# Patient Record
Sex: MALE | Race: WHITE
[De-identification: names, ages, dates, MRNs, and addresses within clinical notes are randomized per-mention and may not be internally consistent; named-entity substitution may affect disease eponyms.]

---

## 2018-08-23 NOTE — RADIOLOGY REPORT (SQ)
EXAM DESCRIPTION:  CHEST PA/LATERAL



COMPLETED DATE/TIME:  8/23/2018 10:50 am



REASON FOR STUDY:  PRE-OP



COMPARISON:  None.



EXAM PARAMETERS:  NUMBER OF VIEWS: two views

TECHNIQUE: Digital Frontal and Lateral radiographic views of the chest acquired.

RADIATION DOSE: NA

LIMITATIONS: none



FINDINGS:  LUNGS AND PLEURA: Mild bronchiectasis in the perihilar regions bilaterally.  No focal cons
olidation.  Chronic elevation right diaphragm.

MEDIASTINUM AND HILAR STRUCTURES: No masses or contour abnormalities.

HEART AND VASCULAR STRUCTURES: Heart normal size.  No evidence for failure.

BONES: No acute findings.

HARDWARE: None in the chest.

OTHER: No other significant finding.



IMPRESSION:  No acute findings in the chest.



TECHNICAL DOCUMENTATION:  JOB ID:  [id number]

 2011 [employer]- All Rights Reserved



Reading location - IP/workstation name: [email or web address]

## 2018-09-07 NOTE — PHYSICIAN ADVISORY NOTE
Physician Advisor ProgressNote


.: 


Pursuant to the  [facility], I have reviewed the medical record 

for this patient.  





Physician Advisor Statement: 





Surgical necessity - VERY NICELY DOCUMENTED IN H&P INCLUDING X-RAY DETAILS.





Status:  Blue MCR Adv pt.  Need to know if there is pre-authorization for Inpt 

or if determination is needed like typical MCR cases.  Will update this note 

when this info is available.





CK

## 2018-09-24 NOTE — OPERATIVE REPORT
Operative Report


DATE OF SURGERY: 09/24/18


PREOPERATIVE DIAGNOSIS: Left knee arthritis


OPERATION: Left knee arthroplasty


SURGEON: KARLY GARCIA


ANESTHESIA: Spinal


TISSUE REMOVED OR ALTERED: Bone to pathology


ESTIMATED BLOOD LOSS: 100


PROCEDURE: 





Implants used:


Femur: [place] triathlon size 5 CR femur


Tibia: 4 tibia


Tibial liner: 9 mm CS insert


Patella: 35 mm oval patella





Procedure with the patient supine on the operating table the left the limb is 

prepped and draped in a sterile fashion.  The limb was elevated for 

exsanguination and the tourniquet inflated to 280 torr.  A standard midline 

median parapatellar approach the knee is taken.  Access is gained to the 

femoral canal through the intercondylar notch.  Intramedullary alignment 

instrumentation used to resect 10 mm of distal femur in 5 of valgus.  Sizing 

guide indicated a size 5 femur.  Appropriate cutting jig is then used to 

fashion anterior posterior and chamfer cuts.  A trial reduction femurs 

performed and this is judged to be adequate.





Attention was next turned to the tibia.  Using an extra medullary alignment 

system 9 millimeters was resected off the lateral tibial plateau.  This is 

sized to a size 4 tibia.  A trial reduction was now performed with a 5 femur 

and a for tibia using a 9 millimeters spacer.  It is full extension and central 

patellofemoral tracking.  The articular surface the patella was next resected 

using an oscillating saw.





All trial implants were removed.  Polymethylmethacrylate is mixed and used to 

cement the above implants in place.  On adequate curing the cement excess 

cement was removed the tourniquet was deflated hemostasis obtained the wound is 

then closed in layers using interrupted Vicryl followed by staples.  A sterile 

compressive dressing was applied and the patient returned to recovery room in 

satisfactory condition.

## 2018-09-24 NOTE — RADIOLOGY REPORT (SQ)
EXAM DESCRIPTION:  KNEE LEFT 2 VIEWS



COMPLETED DATE/TIME:  9/24/2018 9:16 am



REASON FOR STUDY:  Post OP -Long Cassette in PACU M17.12  UNILATERAL PRIMARY OSTEOARTHRITIS, LEFT KNE
E



COMPARISON:  None.



NUMBER OF VIEWS:  Two views



TECHNIQUE:  Digital radiographic images of the left knee post-procedure.



LIMITATIONS:  None.



FINDINGS:  BONES: No  worrisome or unexpected findings post-procedure.

DEVICE: Left total knee replacement with patellar resurfacing.

SOFT TISSUES:  No worrisome findings.  Expected postoperative soft tissue changes.



IMPRESSION:  SATISFACTORY POSTOPERATIVE LEFT KNEE.



TECHNICAL DOCUMENTATION:  JOB ID:  [id number]

 2011 [employer]- All Rights Reserved



Reading location - IP/workstation name: [email or web address]

## 2018-09-25 NOTE — PDOC PROGRESS REPORT
Subjective


Progress Note for:: 09/25/18


Reason For Visit: 


M17.12 UNILATERAL PRIMARY OSTEOARTHRITIS, LEFT KNE


68-year-old white male postop day 1 status post left knee arthroplasty.  

Patient with a temperature max of 38.1 last night and and ambulation of 5 feet 

yesterday with physical therapy





Physical Exam


Vital Signs: 


 











Temp Pulse Resp BP Pulse Ox


 


 36.9 C   96   16   110/57 L  96 


 


 09/25/18 03:36  09/25/18 00:08  09/25/18 00:08  09/25/18 00:08  09/25/18 00:08








 Intake & Output











 09/23/18 09/24/18 09/25/18





 06:59 06:59 06:59


 


Intake Total  0 4650


 


Output Total   3675


 


Balance  0 975


 


Weight   111.4 kg











General appearance: PRESENT: no acute distress, mild distress


Head exam: PRESENT: normocephalic


Respiratory exam: PRESENT: unlabored


Cardiovascular exam: PRESENT: RRR


Vascular exam: PRESENT: normal capillary refill


GI/Abdominal exam: PRESENT: soft


Rectal exam: PRESENT: deferred


Extremities exam: PRESENT: other - Left lower extremity dressing clean dry and 

intact.  Distal neurovascular examination is intact.


Neurological exam: PRESENT: alert, awake, oriented to person, oriented to place

, oriented to time, oriented to situation.  ABSENT: motor sensory deficit


Psychiatric exam: PRESENT: appropriate affect, normal mood.  ABSENT: homicidal 

ideation, suicidal ideation


Skin exam: PRESENT: dry, intact, warm.  ABSENT: cyanosis, rash





Results


Laboratory Results: 


 





 09/25/18 06:10 





 











  09/24/18 09/25/18





  06:01 06:10


 


WBC   11.9 H


 


RBC   4.09 L


 


Hgb   13.0 L


 


Hct   37.7 L


 


MCV   92


 


MCH   31.8


 


MCHC   34.5


 


RDW   13.3


 


Plt Count   145 L


 


Potassium  4.2 











Impressions: 


 





Knee X-Ray  09/24/18 08:35


IMPRESSION:  SATISFACTORY POSTOPERATIVE LEFT KNEE.


 











Status: Imported from PACS





Assessment & Plan





- Diagnosis


(1) Status post left knee replacement


Is this a current diagnosis for this admission?: Yes   


Plan: 


Patient to be mobilized with physical therapy and weightbearing as tolerated 

basis.  This point the patient does not have the functional capacity for 

consideration of discharge home.  We will continue on with physical therapy 

today and observe for further febrile episodes.  Anticipate discharge home 

tomorrow with home health services and DME.








- Time


Time Spent with patient: 15-24 minutes


Anticipated discharge: Home with Homehealth


Within: within 24 hours

## 2018-09-26 NOTE — PDOC DISCHARGE SUMMARY
General





- Admit/Disc Date/PCP


Admission Date/Primary Care Provider: 


  09/24/18 05:38





  REBECCA CARCAMO





Discharge Date: 09/26/18





- Discharge Diagnosis


(1) Status post left knee replacement


Is this a current diagnosis for this admission?: Yes   





- Additional Information


Resuscitation Status: Full Code


Home Medications: 








Acetaminophen with Codeine [Tylenol #3 Tablet] 1 tab PO BID 09/24/18 


Atorvastatin Calcium [Lipitor 10 mg Tablet] 10 mg PO QHS 09/24/18 


Baclofen [Baclofen 10 mg Tablet] 10 mg PO BID 09/24/18 


Cholecalciferol (Vitamin D3) [Vitamin D3] 2,000 unit PO DAILY 09/24/18 


Cyanocobalamin (Vitamin B-12) [Vitamin B-12 1000 mcg Tablet] 1,000 mcg PO DAILY 

09/24/18 


Lisinopril/Hydrochlorothiazide [Lisinopril-Hctz 20-12.5 mg Tab] 1 tab PO DAILY 

09/24/18 


Piroxicam [Feldene] 20 mg PO DAILY 09/24/18 











History of Present Illness


History of Present Illness: 


JOSE RAMON NICOLAS is a 68 year old male


Patient is a 68-year-old white male with progressive bilateral knee pain and 

functional disability secondary osteoarthritis.  Patient is admitted for 

elective left knee arthroplasty.





Hospital Course


Hospital Course: 





Patient is admitted through the operating where he undergoes unconjugated left 

knee arthroplasty.  Is returned to floor in satisfactory condition makes 

excellent progress with physical therapy and ablating 200 feet.  He 

subsequently for discharge home.





Physical Exam


Vital Signs: 


 











Temp Pulse Resp BP Pulse Ox


 


 36.7 C   87   18   112/57 L  93 


 


 09/25/18 23:11  09/25/18 23:11  09/25/18 23:11  09/25/18 23:11  09/25/18 23:11








 Intake & Output











 09/25/18 09/26/18 09/27/18





 06:59 06:59 06:59


 


Intake Total 4822 1813 


 


Output Total 9145 540 


 


Balance 1147 1273 


 


Weight 111.4 kg 110.2 kg 











General appearance: PRESENT: no acute distress


Head exam: PRESENT: normocephalic


Respiratory exam: PRESENT: unlabored


Cardiovascular exam: PRESENT: RRR


Pulses: PRESENT: +1 pedal pulses bilateral


Vascular exam: PRESENT: normal capillary refill


GI/Abdominal exam: PRESENT: soft


Rectal exam: PRESENT: deferred


Extremities exam: PRESENT: other - Left knee compressive dressings removed on 

postop day 2.  The underlying postoperative dressing remains clean dry and 

intact.  There is minimal pedal edema.  Distal neurovascular examination is 

intact.


Neurological exam: PRESENT: alert, awake, oriented to person, oriented to place

, oriented to time, oriented to situation.  ABSENT: motor sensory deficit


Psychiatric exam: PRESENT: appropriate affect, normal mood.  ABSENT: homicidal 

ideation, suicidal ideation


Skin exam: PRESENT: dry, intact, warm.  ABSENT: cyanosis, rash





Results


Laboratory Results: 


 





 09/26/18 04:50 





 09/25/18 06:10 





 











  09/26/18





  04:50


 


WBC  14.4 H


 


RBC  3.78 L


 


Hgb  12.0 L


 


Hct  35.6 L


 


MCV  94


 


MCH  31.8


 


MCHC  33.8


 


RDW  13.6


 


Plt Count  141 L











Impressions: 


 





Knee X-Ray  09/24/18 08:35


IMPRESSION:  SATISFACTORY POSTOPERATIVE LEFT KNEE.


 











Status: Imported from PACS





Qualifiers





- *


PATIENT BEING DISCHARGED WITH ANY OF THE FOLLOWING DIAGNOSIS: No


VTE patient discharged on overlapping Therapy?: Yes





Plan


Discharge Plan: 





Patient be discharged home with home health nursing, home health physical 

therapy, and DME.  Follow-up Dr. Jessica Sims [place] for surgery in 2 weeks 

for staple removal.


Time Spent: Less than 30 Minutes

## 2018-10-31 ENCOUNTER — HOSPITAL ENCOUNTER (OUTPATIENT)
Dept: HOSPITAL 62 - OROUT | Age: 68
Discharge: HOME | End: 2018-10-31
Attending: ORTHOPAEDIC SURGERY
Payer: MEDICARE

## 2018-10-31 VITALS — DIASTOLIC BLOOD PRESSURE: 72 MMHG | SYSTOLIC BLOOD PRESSURE: 100 MMHG

## 2018-10-31 DIAGNOSIS — M19.90: ICD-10-CM

## 2018-10-31 DIAGNOSIS — M24.662: Primary | ICD-10-CM

## 2018-10-31 DIAGNOSIS — Z96.651: ICD-10-CM

## 2018-10-31 DIAGNOSIS — Z87.891: ICD-10-CM

## 2018-10-31 DIAGNOSIS — E78.00: ICD-10-CM

## 2018-10-31 DIAGNOSIS — Z79.899: ICD-10-CM

## 2018-10-31 DIAGNOSIS — I10: ICD-10-CM

## 2018-10-31 LAB
ANION GAP SERPL CALC-SCNC: 12 MMOL/L (ref 5–19)
BUN SERPL-MCNC: 32 MG/DL (ref 7–20)
CALCIUM: 9.9 MG/DL (ref 8.4–10.2)
CHLORIDE SERPL-SCNC: 102 MMOL/L (ref 98–107)
CO2 SERPL-SCNC: 25 MMOL/L (ref 22–30)
ERYTHROCYTE [DISTWIDTH] IN BLOOD BY AUTOMATED COUNT: 13.9 % (ref 11.5–14)
GLUCOSE SERPL-MCNC: 114 MG/DL (ref 75–110)
HCT VFR BLD CALC: 38.6 % (ref 37.9–51)
HGB BLD-MCNC: 13.3 G/DL (ref 13.5–17)
MCH RBC QN AUTO: 31.9 PG (ref 27–33.4)
MCHC RBC AUTO-ENTMCNC: 34.4 G/DL (ref 32–36)
MCV RBC AUTO: 93 FL (ref 80–97)
PLATELET # BLD: 147 10^3/UL (ref 150–450)
POTASSIUM SERPL-SCNC: 4.4 MMOL/L (ref 3.6–5)
RBC # BLD AUTO: 4.17 10^6/UL (ref 4.35–5.55)
SODIUM SERPL-SCNC: 139.3 MMOL/L (ref 137–145)
WBC # BLD AUTO: 7.7 10^3/UL (ref 4–10.5)

## 2018-10-31 PROCEDURE — 36415 COLL VENOUS BLD VENIPUNCTURE: CPT

## 2018-10-31 PROCEDURE — 85027 COMPLETE CBC AUTOMATED: CPT

## 2018-10-31 PROCEDURE — 80048 BASIC METABOLIC PNL TOTAL CA: CPT

## 2018-10-31 PROCEDURE — 29999 UNLISTED PX ARTHROSCOPY: CPT

## 2018-10-31 RX ADMIN — FENTANYL CITRATE ONE MCG: 50 INJECTION INTRAMUSCULAR; INTRAVENOUS at 09:15

## 2018-10-31 RX ADMIN — FENTANYL CITRATE ONE MCG: 50 INJECTION INTRAMUSCULAR; INTRAVENOUS at 09:20

## 2018-10-31 NOTE — OPERATIVE REPORT
Operative Report


DATE OF SURGERY: 10/31/18


PREOPERATIVE DIAGNOSIS: Arthrofibrosis following left knee arthroplasty


OPERATION: Closed manipulation left knee


SURGEON: KARLY GARCIA


ANESTHESIA: Moderate Sedation


ESTIMATED BLOOD LOSS: 0


PROCEDURE: 





With the patient supine on the operative table under conscious sedation knee 

range of motion is examined.  There is adequate patella mobility.  Passive 

range of motion is approximately 8-80 degrees.  The knee is manipulated into 

complete extension as well this as well as further flexion to 135 degrees.  All 

of the extremes of range of motion as well as the preoperative range of motion 

are documented by intraoperative photography.  The patient's return to the 

recovery room in satisfactory condition.

## 2018-10-31 NOTE — DISCHARGE SUMMARY
Discharge Summary (SDC)





- Discharge


Final Diagnosis: 





Arthrofibrosis following left knee arthroplasty


Date of Surgery: 10/31/18


Discharge Date: 10/31/18


Condition: Good


Treatment or Instructions: 


Activity as tolerated


Prescriptions: 


Acetaminophen with Codeine [Tylenol #3 Tablet] 1 tab PO Q6 #60 tablet


Referrals: 


REBECCA CARCAMO MD [Primary Care Provider] - 


Discharge Diet: As Tolerated, Regular


Respiratory Treatments at Home: Deep Breathing/Coughing


Discharge Activity: Activity As Tolerated, No tub bath, Other - Continue 

physical therapy for aggressive range of motion exercises


Home Care Assistance: None Needed


Activities Provided by Home Health Agency: Physical Therapy


Report the Following to Your Physician Immediately: Shortness of Breath, Fever 

over 101 Degrees, Drainage-Foul Smelling

## 2018-11-13 ENCOUNTER — HOSPITAL ENCOUNTER (OUTPATIENT)
Dept: HOSPITAL 62 - OD | Age: 68
End: 2018-11-13
Attending: ORTHOPAEDIC SURGERY
Payer: MEDICARE

## 2018-11-13 DIAGNOSIS — Z01.818: Primary | ICD-10-CM

## 2018-11-13 LAB
ADD MANUAL DIFF: NO
ANION GAP SERPL CALC-SCNC: 12 MMOL/L (ref 5–19)
APPEARANCE UR: (no result)
APTT PPP: YELLOW S
BASOPHILS # BLD AUTO: 0.1 10^3/UL (ref 0–0.2)
BASOPHILS NFR BLD AUTO: 1.4 % (ref 0–2)
BILIRUB UR QL STRIP: NEGATIVE
BUN SERPL-MCNC: 26 MG/DL (ref 7–20)
CALCIUM: 10.1 MG/DL (ref 8.4–10.2)
CHLORIDE SERPL-SCNC: 102 MMOL/L (ref 98–107)
CO2 SERPL-SCNC: 28 MMOL/L (ref 22–30)
EOSINOPHIL # BLD AUTO: 0.5 10^3/UL (ref 0–0.6)
EOSINOPHIL NFR BLD AUTO: 6.1 % (ref 0–6)
ERYTHROCYTE [DISTWIDTH] IN BLOOD BY AUTOMATED COUNT: 14 % (ref 11.5–14)
GLUCOSE SERPL-MCNC: 92 MG/DL (ref 75–110)
GLUCOSE UR STRIP-MCNC: NEGATIVE MG/DL
HCT VFR BLD CALC: 38.5 % (ref 37.9–51)
HGB BLD-MCNC: 13.2 G/DL (ref 13.5–17)
KETONES UR STRIP-MCNC: NEGATIVE MG/DL
LYMPHOCYTES # BLD AUTO: 1.3 10^3/UL (ref 0.5–4.7)
LYMPHOCYTES NFR BLD AUTO: 17 % (ref 13–45)
MCH RBC QN AUTO: 31.6 PG (ref 27–33.4)
MCHC RBC AUTO-ENTMCNC: 34.2 G/DL (ref 32–36)
MCV RBC AUTO: 92 FL (ref 80–97)
MONOCYTES # BLD AUTO: 1 10^3/UL (ref 0.1–1.4)
MONOCYTES NFR BLD AUTO: 13.5 % (ref 3–13)
NEUTROPHILS # BLD AUTO: 4.7 10^3/UL (ref 1.7–8.2)
NEUTS SEG NFR BLD AUTO: 62 % (ref 42–78)
NITRITE UR QL STRIP: NEGATIVE
PH UR STRIP: 5 [PH] (ref 5–9)
PLATELET # BLD: 270 10^3/UL (ref 150–450)
POTASSIUM SERPL-SCNC: 5.1 MMOL/L (ref 3.6–5)
PROT UR STRIP-MCNC: NEGATIVE MG/DL
RBC # BLD AUTO: 4.18 10^6/UL (ref 4.35–5.55)
SODIUM SERPL-SCNC: 142.3 MMOL/L (ref 137–145)
SP GR UR STRIP: 1.02
TOTAL CELLS COUNTED % (AUTO): 100 %
UROBILINOGEN UR-MCNC: NEGATIVE MG/DL (ref ?–2)
WBC # BLD AUTO: 7.6 10^3/UL (ref 4–10.5)

## 2018-11-13 PROCEDURE — 93005 ELECTROCARDIOGRAM TRACING: CPT

## 2018-11-13 PROCEDURE — 36415 COLL VENOUS BLD VENIPUNCTURE: CPT

## 2018-11-13 PROCEDURE — 80048 BASIC METABOLIC PNL TOTAL CA: CPT

## 2018-11-13 PROCEDURE — 85025 COMPLETE CBC W/AUTO DIFF WBC: CPT

## 2018-11-13 PROCEDURE — 81001 URINALYSIS AUTO W/SCOPE: CPT

## 2018-11-13 PROCEDURE — 93010 ELECTROCARDIOGRAM REPORT: CPT

## 2018-11-13 PROCEDURE — 71046 X-RAY EXAM CHEST 2 VIEWS: CPT

## 2018-11-13 NOTE — RADIOLOGY REPORT (SQ)
EXAM DESCRIPTION:  CHEST PA/LATERAL



COMPLETED DATE/TIME:  11/13/2018 12:25 pm



REASON FOR STUDY:  PRE-OP



COMPARISON:  CT abdomen pelvis 2/26/2013

Chest films 8/23/2018



EXAM PARAMETERS:  NUMBER OF VIEWS: two views

TECHNIQUE: Digital Frontal and Lateral radiographic views of the chest acquired.

RADIATION DOSE: NA

LIMITATIONS: none



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
ragmatic paralysis.

There are increased interstitial markings throughout the right lung, unchanged, likely micro atelecta
sis or scarring.

No acute infiltrates.  No pleural effusion.  No pneumothorax.

MEDIASTINUM AND HILAR STRUCTURES: No masses or contour abnormalities.

HEART AND VASCULAR STRUCTURES: Heart normal size.  No evidence for failure.

BONES: No acute findings.

HARDWARE: None in the chest.

OTHER: No other significant finding.



IMPRESSION:  Chronic elevation right hemidiaphragm with chronic increased interstitial markings in th
e right lung

No acute findings



TECHNICAL DOCUMENTATION:  JOB ID:  [id number]

 2011 [employer]- All Rights Reserved



Reading location - IP/workstation name: [email or web address]

## 2018-11-14 NOTE — EKG REPORT
SEVERITY:- ABNORMAL ECG -

SINUS RHYTHM

RIGHT BUNDLE BRANCH BLOCK

:

Confirmed by: Keiry Blake 14-Nov-2018 07:25:28

## 2018-12-10 ENCOUNTER — HOSPITAL ENCOUNTER (INPATIENT)
Dept: HOSPITAL 62 - INOR | Age: 68
LOS: 2 days | Discharge: HOME HEALTH | DRG: 470 | End: 2018-12-12
Attending: ORTHOPAEDIC SURGERY | Admitting: ORTHOPAEDIC SURGERY
Payer: MEDICARE

## 2018-12-10 DIAGNOSIS — Z96.652: ICD-10-CM

## 2018-12-10 DIAGNOSIS — T84.82XA: ICD-10-CM

## 2018-12-10 DIAGNOSIS — E78.00: ICD-10-CM

## 2018-12-10 DIAGNOSIS — M17.11: Primary | ICD-10-CM

## 2018-12-10 DIAGNOSIS — Z23: ICD-10-CM

## 2018-12-10 DIAGNOSIS — I10: ICD-10-CM

## 2018-12-10 PROCEDURE — 0SRC0J9 REPLACEMENT OF RIGHT KNEE JOINT WITH SYNTHETIC SUBSTITUTE, CEMENTED, OPEN APPROACH: ICD-10-PCS | Performed by: ORTHOPAEDIC SURGERY

## 2018-12-10 PROCEDURE — 88311 DECALCIFY TISSUE: CPT

## 2018-12-10 PROCEDURE — 85027 COMPLETE CBC AUTOMATED: CPT

## 2018-12-10 PROCEDURE — 01402 ANES OPN/ARTH TOT KNE ARTHRP: CPT

## 2018-12-10 PROCEDURE — G0008 ADMIN INFLUENZA VIRUS VAC: HCPCS

## 2018-12-10 PROCEDURE — 90471 IMMUNIZATION ADMIN: CPT

## 2018-12-10 PROCEDURE — 88304 TISSUE EXAM BY PATHOLOGIST: CPT

## 2018-12-10 PROCEDURE — 94799 UNLISTED PULMONARY SVC/PX: CPT

## 2018-12-10 PROCEDURE — 90686 IIV4 VACC NO PRSV 0.5 ML IM: CPT

## 2018-12-10 PROCEDURE — C1776 JOINT DEVICE (IMPLANTABLE): HCPCS

## 2018-12-10 PROCEDURE — 84132 ASSAY OF SERUM POTASSIUM: CPT

## 2018-12-10 PROCEDURE — 36415 COLL VENOUS BLD VENIPUNCTURE: CPT

## 2018-12-10 PROCEDURE — 80048 BASIC METABOLIC PNL TOTAL CA: CPT

## 2018-12-10 PROCEDURE — C1713 ANCHOR/SCREW BN/BN,TIS/BN: HCPCS

## 2018-12-10 RX ADMIN — IBUPROFEN SCH MLS/HR: 800 INJECTION INTRAVENOUS at 15:59

## 2018-12-10 RX ADMIN — PREGABALIN SCH MG: 75 CAPSULE ORAL at 11:34

## 2018-12-10 RX ADMIN — SENNOSIDES, DOCUSATE SODIUM SCH EACH: 50; 8.6 TABLET, FILM COATED ORAL at 11:34

## 2018-12-10 RX ADMIN — PREGABALIN SCH MG: 75 CAPSULE ORAL at 17:22

## 2018-12-10 RX ADMIN — Medication SCH CAP: at 11:34

## 2018-12-10 RX ADMIN — MORPHINE SULFATE PRN MG: 10 INJECTION INTRAMUSCULAR; INTRAVENOUS; SUBCUTANEOUS at 15:43

## 2018-12-10 RX ADMIN — OXYCODONE HYDROCHLORIDE SCH MG: 10 TABLET, FILM COATED, EXTENDED RELEASE ORAL at 11:12

## 2018-12-10 RX ADMIN — LISINOPRIL SCH: 10 TABLET ORAL at 11:36

## 2018-12-10 RX ADMIN — SENNOSIDES, DOCUSATE SODIUM SCH EACH: 50; 8.6 TABLET, FILM COATED ORAL at 17:22

## 2018-12-10 RX ADMIN — MORPHINE SULFATE PRN MG: 10 INJECTION INTRAMUSCULAR; INTRAVENOUS; SUBCUTANEOUS at 13:51

## 2018-12-10 RX ADMIN — Medication SCH: at 13:34

## 2018-12-10 RX ADMIN — BACLOFEN SCH MG: 10 TABLET ORAL at 11:34

## 2018-12-10 RX ADMIN — BACLOFEN SCH MG: 10 TABLET ORAL at 17:22

## 2018-12-10 RX ADMIN — ATORVASTATIN CALCIUM SCH MG: 10 TABLET, FILM COATED ORAL at 22:00

## 2018-12-10 RX ADMIN — HYDROCHLOROTHIAZIDE SCH: 12.5 CAPSULE ORAL at 11:35

## 2018-12-10 RX ADMIN — Medication SCH ML: at 22:00

## 2018-12-10 RX ADMIN — OXYCODONE HYDROCHLORIDE SCH MG: 10 TABLET, FILM COATED, EXTENDED RELEASE ORAL at 21:59

## 2018-12-10 NOTE — OPERATIVE REPORT
Operative Report


DATE OF SURGERY: 12/10/18


PREOPERATIVE DIAGNOSIS: Knee arthritis


OPERATION: Right knee arthroplasty


SURGEON: KARLY GARCIA


ANESTHESIA: Spinal


TISSUE REMOVED OR ALTERED: Bone to pathology


ESTIMATED BLOOD LOSS: 100


PROCEDURE: 





Implants used:


Femur: [place] triathlon size 7 CR femur


Tibia: 6 tibia


Tibial liner: 9 mm CS insert


Patella: 38 mm oval patella





Procedure with the patient supine on the operating table the right the limb is 

prepped and draped in a sterile fashion.  The limb was elevated for 

exsanguination and the tourniquet inflated to 280 torr.  A standard midline 

median parapatellar approach the knee is taken.  Access is gained to the 

femoral canal through the intercondylar notch.  Intramedullary alignment 

instrumentation used to resect 10 mm of distal femur in 5 of valgus.  Sizing 

guide indicated a size 7 femur.  Appropriate cutting jig is then used to 

fashion anterior posterior and chamfer cuts.  A trial reduction femurs 

performed and this is judged to be adequate.





Attention was next turned to the tibia.  Using an extra medullary alignment 

system 9 millimeters was resected off the lateral tibial plateau.  This is 

sized to a size 6 tibia.  A trial reduction was now performed with a 7 femur 

and a 6 tibia using a 9 millimeters spacer.  It is full extension and central 

patellofemoral tracking.  The articular surface the patella was next resected 

using an oscillating saw.





All trial implants were removed.  Polymethylmethacrylate is mixed and used to 

cement the above implants in place.  On adequate curing the cement excess 

cement was removed the tourniquet was deflated hemostasis obtained the wound is 

then closed in layers using interrupted Vicryl followed by staples.  A sterile 

compressive dressing was applied and the patient returned to recovery room in 

satisfactory condition.

## 2018-12-10 NOTE — RADIOLOGY REPORT (SQ)
EXAM DESCRIPTION:  KNEE RIGHT 2 VIEWS



COMPLETED DATE/TIME:  12/10/2018 9:08 am



REASON FOR STUDY:  Post OP -Long Cassette in PACU M17.11  UNILATERAL PRIMARY OSTEOARTHRITIS, RIGHT KN
EE



COMPARISON:  9/24/2018



NUMBER OF VIEWS:  Two views.



TECHNIQUE:  AP and lateral radiographic images acquired of the right knee.



LIMITATIONS:  None.



FINDINGS:  MINERALIZATION: Normal.

BONES: No acute fracture or dislocation.  No worrisome bone lesions.

JOINT: Expected postoperative findings status post right knee total arthroplasty with postoperative i
ntra-articular air and overlying skin staples.

SOFT TISSUES: No soft tissue swelling.  No radio-opaque foreign body.

OTHER: No other significant finding.



IMPRESSION:  Expected postoperative findings status post right knee total arthroplasty with postopera
tive intra-articular air and overlying skin staples.  No evidence perihardware fracture.



TECHNICAL DOCUMENTATION:  JOB ID:  [id number]

 2011 [employer]- All Rights Reserved



Reading location - IP/workstation name: [email or web address]

## 2018-12-11 LAB
ANION GAP SERPL CALC-SCNC: 13 MMOL/L (ref 5–19)
BUN SERPL-MCNC: 15 MG/DL (ref 7–20)
CALCIUM: 9.5 MG/DL (ref 8.4–10.2)
CHLORIDE SERPL-SCNC: 100 MMOL/L (ref 98–107)
CO2 SERPL-SCNC: 24 MMOL/L (ref 22–30)
ERYTHROCYTE [DISTWIDTH] IN BLOOD BY AUTOMATED COUNT: 14.4 % (ref 11.5–14)
GLUCOSE SERPL-MCNC: 147 MG/DL (ref 75–110)
HCT VFR BLD CALC: 35.2 % (ref 37.9–51)
HGB BLD-MCNC: 11.9 G/DL (ref 13.5–17)
MCH RBC QN AUTO: 30.8 PG (ref 27–33.4)
MCHC RBC AUTO-ENTMCNC: 33.8 G/DL (ref 32–36)
MCV RBC AUTO: 91 FL (ref 80–97)
PLATELET # BLD: 156 10^3/UL (ref 150–450)
POTASSIUM SERPL-SCNC: 4.5 MMOL/L (ref 3.6–5)
RBC # BLD AUTO: 3.86 10^6/UL (ref 4.35–5.55)
SODIUM SERPL-SCNC: 136.5 MMOL/L (ref 137–145)
WBC # BLD AUTO: 12.7 10^3/UL (ref 4–10.5)

## 2018-12-11 RX ADMIN — OXYCODONE HYDROCHLORIDE PRN MG: 5 TABLET ORAL at 12:26

## 2018-12-11 RX ADMIN — LISINOPRIL SCH MG: 10 TABLET ORAL at 10:19

## 2018-12-11 RX ADMIN — Medication SCH ML: at 05:42

## 2018-12-11 RX ADMIN — Medication SCH CAP: at 10:19

## 2018-12-11 RX ADMIN — ASPIRIN SCH MG: 81 TABLET, COATED ORAL at 10:20

## 2018-12-11 RX ADMIN — OXYCODONE HYDROCHLORIDE PRN MG: 5 TABLET ORAL at 07:47

## 2018-12-11 RX ADMIN — IBUPROFEN SCH MLS/HR: 800 INJECTION INTRAVENOUS at 17:24

## 2018-12-11 RX ADMIN — Medication SCH ML: at 21:28

## 2018-12-11 RX ADMIN — LANSOPRAZOLE SCH MG: 30 TABLET, ORALLY DISINTEGRATING, DELAYED RELEASE ORAL at 05:42

## 2018-12-11 RX ADMIN — OXYCODONE HYDROCHLORIDE SCH MG: 10 TABLET, FILM COATED, EXTENDED RELEASE ORAL at 21:27

## 2018-12-11 RX ADMIN — IBUPROFEN SCH MLS/HR: 800 INJECTION INTRAVENOUS at 07:53

## 2018-12-11 RX ADMIN — PREGABALIN SCH MG: 75 CAPSULE ORAL at 10:19

## 2018-12-11 RX ADMIN — BACLOFEN SCH MG: 10 TABLET ORAL at 10:20

## 2018-12-11 RX ADMIN — Medication SCH: at 14:12

## 2018-12-11 RX ADMIN — HYDROCHLOROTHIAZIDE SCH MG: 12.5 CAPSULE ORAL at 10:20

## 2018-12-11 RX ADMIN — IBUPROFEN SCH MLS/HR: 800 INJECTION INTRAVENOUS at 00:41

## 2018-12-11 RX ADMIN — ATORVASTATIN CALCIUM SCH MG: 10 TABLET, FILM COATED ORAL at 21:27

## 2018-12-11 RX ADMIN — PREGABALIN SCH MG: 75 CAPSULE ORAL at 17:23

## 2018-12-11 RX ADMIN — SENNOSIDES, DOCUSATE SODIUM SCH EACH: 50; 8.6 TABLET, FILM COATED ORAL at 17:23

## 2018-12-11 RX ADMIN — SENNOSIDES, DOCUSATE SODIUM SCH EACH: 50; 8.6 TABLET, FILM COATED ORAL at 10:19

## 2018-12-11 RX ADMIN — OXYCODONE HYDROCHLORIDE SCH MG: 10 TABLET, FILM COATED, EXTENDED RELEASE ORAL at 10:21

## 2018-12-11 RX ADMIN — OXYCODONE HYDROCHLORIDE PRN MG: 5 TABLET ORAL at 17:23

## 2018-12-11 RX ADMIN — BACLOFEN SCH MG: 10 TABLET ORAL at 17:24

## 2018-12-11 RX ADMIN — MORPHINE SULFATE PRN MG: 10 INJECTION INTRAMUSCULAR; INTRAVENOUS; SUBCUTANEOUS at 02:42

## 2018-12-11 NOTE — PDOC PROGRESS REPORT
Subjective


Progress Note for:: 12/11/18


Reason For Visit: 


M17.11 UNILATERAL PRIMARY OSTEOARTHRITIS, RIGHT KN


68-year-old white male postop day 1 status post right knee arthroplasty.  

Patient ambulated 100 feet with physical therapy yesterday.  Is complaining of 

pain today although appears relatively sedated by my estimation.





Physical Exam


Vital Signs: 


 











Temp Pulse Resp BP Pulse Ox


 


 36.9 C   91   16   134/64 H  98 


 


 12/11/18 00:01  12/11/18 00:01  12/11/18 00:01  12/11/18 00:01  12/11/18 00:01








 Intake & Output











 12/10/18 12/11/18 12/12/18





 06:59 06:59 06:59


 


Intake Total 0 5550 


 


Output Total  3150 


 


Balance 0 2400 


 


Weight  103.2 kg 











Physical Exam: 





Middle-aged white male somewhat difficult to hold a conversation with him that 

he tends to drift his attention.


General appearance: PRESENT: no acute distress, mild distress


Head exam: PRESENT: normocephalic


Respiratory exam: PRESENT: unlabored


Cardiovascular exam: PRESENT: RRR


Pulses: PRESENT: +1 pedal pulses bilateral


Vascular exam: PRESENT: normal capillary refill


GI/Abdominal exam: PRESENT: soft


Rectal exam: PRESENT: deferred


Extremities exam: PRESENT: other - Right lower extremity dressing clean dry and 

intact.  Distal neurovascular examination is intact.


Neurological exam: PRESENT: alert, awake, oriented to person, oriented to place

, oriented to time, oriented to situation.  ABSENT: motor sensory deficit


Psychiatric exam: PRESENT: appropriate affect, normal mood.  ABSENT: homicidal 

ideation, suicidal ideation


Skin exam: PRESENT: dry, intact, warm.  ABSENT: cyanosis, rash





Results


Laboratory Results: 


 





 12/11/18 05:28 





 











  12/11/18





  05:28


 


WBC  12.7 H


 


RBC  3.86 L


 


Hgb  11.9 L


 


Hct  35.2 L


 


MCV  91


 


MCH  30.8


 


MCHC  33.8


 


RDW  14.4 H


 


Plt Count  156











Impressions: 


 





Knee X-Ray  12/10/18 08:35


IMPRESSION:  Expected postoperative findings status post right knee total 

arthroplasty with postoperative intra-articular air and overlying skin staples.

  No evidence perihardware fracture.


 











Status: Imported from PACS





Assessment & Plan





- Diagnosis


(1) Arthritis of right knee


Is this a current diagnosis for this admission?: Yes   


Plan: 


Pain medication is adjusted.  Patient will continue with physical therapy.  

Anticipate discharge home tomorrow with home health services.








- Time


Time Spent with patient: 15-24 minutes


Anticipated discharge: Home with Homehealth


Within: within 24 hours

## 2018-12-12 VITALS — SYSTOLIC BLOOD PRESSURE: 137 MMHG | DIASTOLIC BLOOD PRESSURE: 82 MMHG

## 2018-12-12 LAB
ERYTHROCYTE [DISTWIDTH] IN BLOOD BY AUTOMATED COUNT: 14.1 % (ref 11.5–14)
HCT VFR BLD CALC: 31.3 % (ref 37.9–51)
HGB BLD-MCNC: 10.7 G/DL (ref 13.5–17)
MCH RBC QN AUTO: 31.5 PG (ref 27–33.4)
MCHC RBC AUTO-ENTMCNC: 34.2 G/DL (ref 32–36)
MCV RBC AUTO: 92 FL (ref 80–97)
PLATELET # BLD: 148 10^3/UL (ref 150–450)
RBC # BLD AUTO: 3.39 10^6/UL (ref 4.35–5.55)
WBC # BLD AUTO: 16.2 10^3/UL (ref 4–10.5)

## 2018-12-12 PROCEDURE — 3E0234Z INTRODUCTION OF SERUM, TOXOID AND VACCINE INTO MUSCLE, PERCUTANEOUS APPROACH: ICD-10-PCS | Performed by: ORTHOPAEDIC SURGERY

## 2018-12-12 RX ADMIN — PREGABALIN SCH: 75 CAPSULE ORAL at 09:35

## 2018-12-12 RX ADMIN — IBUPROFEN SCH MLS/HR: 800 INJECTION INTRAVENOUS at 00:50

## 2018-12-12 RX ADMIN — OXYCODONE HYDROCHLORIDE PRN MG: 5 TABLET ORAL at 11:17

## 2018-12-12 RX ADMIN — HYDROCHLOROTHIAZIDE SCH MG: 12.5 CAPSULE ORAL at 09:37

## 2018-12-12 RX ADMIN — LANSOPRAZOLE SCH MG: 30 TABLET, ORALLY DISINTEGRATING, DELAYED RELEASE ORAL at 05:26

## 2018-12-12 RX ADMIN — BACLOFEN SCH MG: 10 TABLET ORAL at 09:39

## 2018-12-12 RX ADMIN — SENNOSIDES, DOCUSATE SODIUM SCH EACH: 50; 8.6 TABLET, FILM COATED ORAL at 09:37

## 2018-12-12 RX ADMIN — LISINOPRIL SCH: 10 TABLET ORAL at 09:35

## 2018-12-12 RX ADMIN — ASPIRIN SCH MG: 81 TABLET, COATED ORAL at 09:37

## 2018-12-12 RX ADMIN — Medication SCH CAP: at 09:37

## 2018-12-12 RX ADMIN — Medication SCH ML: at 05:26

## 2018-12-12 NOTE — PDOC DISCHARGE SUMMARY
General





- Admit/Disc Date/PCP


Admission Date/Primary Care Provider: 


  12/10/18 05:31





  REBECCA CARCAMO





Discharge Date: 12/12/18





- Discharge Diagnosis


(1) Arthritis of right knee


Is this a current diagnosis for this admission?: Yes   





- Additional Information


Resuscitation Status: Full Code


Home Medications: 








Acetaminophen with Codeine [Tylenol #3 Tablet] 1 tab PO Q6HP PRN 12/10/18 


Atorvastatin Calcium [Lipitor 10 mg Tablet] 10 mg PO QHS 12/10/18 


Baclofen [Baclofen 10 mg Tablet] 10 mg PO BID 12/10/18 


Cholecalciferol (Vitamin D3) [Vitamin D3 2000 unit Tablet] 2,000 unit PO DAILY 

12/10/18 


Cyanocobalamin (Vitamin B-12) [Vitamin B-12 1000 mcg Tablet] 1,000 mcg PO DAILY 

12/10/18 


Lisinopril/Hydrochlorothiazide [Zestoretic 20-12.5 mg Tablet] 1 tab PO DAILY 12/

10/18 


Piroxicam [Feldene] 20 mg PO DAILY 12/10/18 











History of Present Illness


History of Present Illness: 


JOSE RAMON NICOLAS is a 68 year old male


The patient is a 68-year-old white male with progressive right knee pain and 

functional disability second osteoarthritis.  Patient is admitted for elective 

right knee arthroplasty.





Hospital Course


Hospital Course: 





Patient is admitted through the operating where he undergoes unconjugated right 

knee arthroplasty.  Is returned to floor in satisfactory condition.  He seen by 

physical therapy and begins weightbearing as tolerated toward program.  

Dressing is clean dry and intact.  Distal neurovascular examination is intact.





Physical Exam


Vital Signs: 


 











Temp Pulse Resp BP Pulse Ox


 


 36.9 C   95   17   127/72 H  96 


 


 12/11/18 23:54  12/11/18 23:54  12/11/18 23:54  12/11/18 23:54  12/11/18 23:54








 Intake & Output











 12/11/18 12/12/18 12/13/18





 06:59 06:59 06:59


 


Intake Total 0270 1827 


 


Output Total 3150 945 


 


Balance 2400 882 


 


Weight 103.2 kg 103.5 kg 











General appearance: PRESENT: no acute distress


Head exam: PRESENT: normocephalic


Respiratory exam: PRESENT: unlabored


Cardiovascular exam: PRESENT: RRR


Pulses: PRESENT: +1 pedal pulses bilateral


Vascular exam: PRESENT: normal capillary refill


GI/Abdominal exam: PRESENT: soft


Rectal exam: PRESENT: deferred


Extremities exam: PRESENT: other - Right knee compressive dressing removed on 

postop day 2.  Underlying op site is clean dry and intact.  Distal 

neurovascular examination is intact.


Neurological exam: PRESENT: alert, awake, oriented to person, oriented to place

, oriented to time, oriented to situation.  ABSENT: motor sensory deficit


Psychiatric exam: PRESENT: appropriate affect, normal mood.  ABSENT: homicidal 

ideation, suicidal ideation


Skin exam: PRESENT: dry, intact, warm.  ABSENT: cyanosis, rash





Results


Laboratory Results: 


 





 12/12/18 04:40 





 12/11/18 05:28 





 











  12/11/18 12/12/18





  05:28 04:40


 


WBC   16.2 H


 


RBC   3.39 L


 


Hgb   10.7 L


 


Hct   31.3 L


 


MCV   92


 


MCH   31.5


 


MCHC   34.2


 


RDW   14.1 H


 


Plt Count   148 L


 


Sodium  136.5 L 


 


Potassium  4.5 


 


Chloride  100 


 


Carbon Dioxide  24 


 


Anion Gap  13 


 


BUN  15 


 


Creatinine  0.69 


 


Est GFR ( Amer)  > 60 


 


Est GFR (Non-Af Amer)  > 60 


 


Glucose  147 H 


 


Calcium  9.5 











Impressions: 


 





Knee X-Ray  12/10/18 08:35


IMPRESSION:  Expected postoperative findings status post right knee total 

arthroplasty with postoperative intra-articular air and overlying skin staples.

  No evidence perihardware fracture.


 











Status: Imported from PACS





Qualifiers





- *


PATIENT BEING DISCHARGED WITH ANY OF THE FOLLOWING DIAGNOSIS: No


VTE patient discharged on overlapping Therapy?: Yes





Plan


Discharge Plan: 





Patient be discharged home with home health nursing home versus and DME.  Follow

-up with Dr. Jessica Sims [place] for surgery in 2 weeks for staple removal


Time Spent: Less than 30 Minutes

## 2018-12-16 ENCOUNTER — HOSPITAL ENCOUNTER (EMERGENCY)
Dept: HOSPITAL 62 - ER | Age: 68
Discharge: HOME | End: 2018-12-16
Payer: MEDICARE

## 2018-12-16 VITALS — DIASTOLIC BLOOD PRESSURE: 66 MMHG | SYSTOLIC BLOOD PRESSURE: 113 MMHG

## 2018-12-16 DIAGNOSIS — Z98.890: ICD-10-CM

## 2018-12-16 DIAGNOSIS — Z96.651: ICD-10-CM

## 2018-12-16 DIAGNOSIS — Z48.01: Primary | ICD-10-CM

## 2018-12-16 LAB
ADD MANUAL DIFF: NO
ANION GAP SERPL CALC-SCNC: 10 MMOL/L (ref 5–19)
BASOPHILS # BLD AUTO: 0.1 10^3/UL (ref 0–0.2)
BASOPHILS NFR BLD AUTO: 0.8 % (ref 0–2)
BUN SERPL-MCNC: 28 MG/DL (ref 7–20)
CALCIUM: 9.7 MG/DL (ref 8.4–10.2)
CHLORIDE SERPL-SCNC: 102 MMOL/L (ref 98–107)
CO2 SERPL-SCNC: 26 MMOL/L (ref 22–30)
EOSINOPHIL # BLD AUTO: 0.6 10^3/UL (ref 0–0.6)
EOSINOPHIL NFR BLD AUTO: 4.1 % (ref 0–6)
ERYTHROCYTE [DISTWIDTH] IN BLOOD BY AUTOMATED COUNT: 14.2 % (ref 11.5–14)
GLUCOSE SERPL-MCNC: 167 MG/DL (ref 75–110)
HCT VFR BLD CALC: 33.7 % (ref 37.9–51)
HGB BLD-MCNC: 11.5 G/DL (ref 13.5–17)
LYMPHOCYTES # BLD AUTO: 1.3 10^3/UL (ref 0.5–4.7)
LYMPHOCYTES NFR BLD AUTO: 9.1 % (ref 13–45)
MCH RBC QN AUTO: 30.7 PG (ref 27–33.4)
MCHC RBC AUTO-ENTMCNC: 34.2 G/DL (ref 32–36)
MCV RBC AUTO: 90 FL (ref 80–97)
MONOCYTES # BLD AUTO: 1.7 10^3/UL (ref 0.1–1.4)
MONOCYTES NFR BLD AUTO: 12.6 % (ref 3–13)
NEUTROPHILS # BLD AUTO: 10.2 10^3/UL (ref 1.7–8.2)
NEUTS SEG NFR BLD AUTO: 73.4 % (ref 42–78)
PLATELET # BLD: 328 10^3/UL (ref 150–450)
POTASSIUM SERPL-SCNC: 4.7 MMOL/L (ref 3.6–5)
RBC # BLD AUTO: 3.76 10^6/UL (ref 4.35–5.55)
SODIUM SERPL-SCNC: 137.6 MMOL/L (ref 137–145)
TOTAL CELLS COUNTED % (AUTO): 100 %
WBC # BLD AUTO: 13.9 10^3/UL (ref 4–10.5)

## 2018-12-16 PROCEDURE — 87040 BLOOD CULTURE FOR BACTERIA: CPT

## 2018-12-16 PROCEDURE — 80048 BASIC METABOLIC PNL TOTAL CA: CPT

## 2018-12-16 PROCEDURE — 99284 EMERGENCY DEPT VISIT MOD MDM: CPT

## 2018-12-16 PROCEDURE — 73564 X-RAY EXAM KNEE 4 OR MORE: CPT

## 2018-12-16 PROCEDURE — 36415 COLL VENOUS BLD VENIPUNCTURE: CPT

## 2018-12-16 PROCEDURE — 85025 COMPLETE CBC W/AUTO DIFF WBC: CPT

## 2018-12-16 PROCEDURE — 96374 THER/PROPH/DIAG INJ IV PUSH: CPT

## 2018-12-16 PROCEDURE — 96375 TX/PRO/DX INJ NEW DRUG ADDON: CPT

## 2018-12-16 NOTE — RADIOLOGY REPORT (SQ)
EXAM DESCRIPTION:  KNEE RIGHT 4 VIEWS



COMPLETED DATE/TIME:  12/16/2018 2:55 pm



REASON FOR STUDY:  Purulent drainage from surgical wound



COMPARISON:  Right knee x-ray 12/10/2018.



NUMBER OF VIEWS:  Four views.



TECHNIQUE:  AP, lateral, and both oblique radiographic images acquired of the right knee.



LIMITATIONS:  None.



FINDINGS:  MINERALIZATION: Normal.

BONES:  The patient is status post total right knee arthroplasty with patellar resurfacing.  No acute
 fracture or dislocation.  No worrisome bone lesions.

JOINT: Small effusion.

SOFT TISSUES: Interval development of diffuse soft tissue swelling.  Skin staples are seen at the ant
erior aspect of the knee.



IMPRESSION:  Status post total right knee arthroplasty with no radiographic evidence of acute fractur
e.  Interval development of diffuse soft tissue swelling.  Small joint effusion.



TECHNICAL DOCUMENTATION:  JOB ID:  [id number]

[id number]

 2011 [employer]- All Rights Reserved



Reading location - IP/workstation name: [email or web address]

## 2018-12-16 NOTE — ER DOCUMENT REPORT
ED Medical Screen (RME)





- General


Chief Complaint: Skin Problem


Stated Complaint: OPEN SORES ON LEGS, DRAINAGE


Time Seen by Provider: 12/16/18 13:52


Mode of Arrival: Wheelchair


Information source: Patient, Relative, [facility] Records


Notes: 





68-year-old male with hypertension, diabetes presents with concern for purulent 

drainage from the surgical site of his right knee.  Patient recently underwent 

total knee replacement with Dr. Garcia and was discharged on December 12, 2018.

  Wife reports purulent drainage with bandage changes.  Patient denies 

associated fever, nausea, vomiting.








I have greeted and performed a rapid initial assessment of this patient.  A 

comprehensive ED assessment and evaluation of the patient, analysis of test 

results and completion of medical decision making process we will be contacted 

by additional ED providers.








PHYSICAL EXAMINATION:





Vital signs reviewed-afebrile, tachycardic





GENERAL: Well-appearing, well-nourished and in no acute distress.





LUNGS: No respiratory distress





Musculoskeletal: Dressing in place over right knee.





NEUROLOGICAL:  Normal speech, 





PSYCH: Normal mood, normal affect.





SKIN: Left knee surgical scar clean dry intact.  Right knee with bandage in 

place no obvious erythema of the skin.


TRAVEL OUTSIDE OF THE [place] IN LAST 30 DAYS: No





- HPI


Onset: Last week


Onset/Duration: Gradual, Persistent


Quality of pain: Achy


Associated Symptoms: denies: Chills, Fever, Nausea


Exacerbated by: Movement


Relieved by: Denies


Similar symptoms previously: No


Recently seen / treated by doctor: Yes





- Related Data


Smoking: Non-smoker


Frequency of alcohol use: None


Drug Abuse: None


Allergies/Adverse Reactions: 


 





No Known Allergies Allergy (Verified 11/26/18 11:20)


 











Past Medical History





- Past Medical History


Cardiac Medical History: Reports: Hx Hypercholesterolemia, Hx Hypertension


   Denies: Hx Atrial Fibrillation, Hx Congestive Heart Failure, Hx Coronary 

Artery Disease, Hx Heart Attack, Hx Peripheral Vascular Disease, Hx Pulmonary 

Embolism, Hx Heart Murmur


Pulmonary Medical History: Reports: Hx Bronchitis - FEW YEARS AGO 2013


   Denies: Hx Asthma, Hx COPD, Hx Pneumonia, Hx Respiratory Failure, Hx Sleep 

Apnea, Hx Tuberculosis


Neurological Medical History: Denies: Hx Cerebrovascular Accident, Hx Seizures


Endocrine Medical History: Reports: Hx Diabetes Mellitus Type 2 - edge of being 

diabetic.  Denies: Hx Hyperthyroidism, Hx Hypothyroidism


Renal/ Medical History: Reports: Hx Benign Prostatic Hyperplasia, Hx Kidney 

Stones.  Denies: Hx End Stage Renal Disease, Hx Peritoneal Dialysis


Malignancy Medical History: Denies Hx Lung Cancer


GI Medical History: Reports: Hx Gastroesophageal Reflux Disease.  Denies: Hx 

Crohn's Disease, Hx Hiatal Hernia, Hx Irritable Bowel, Hx Liver Failure, Hx 

Pancreatitis, Hx Ulcer


Musculoskeltal Medical History: Reports Hx Arthritis - OA, Denies Hx 

Fibromyalgia, Denies Hx Muscular Dystrophy


Psychiatric Medical History: 


   Denies: Hx Bipolar Disorder, Hx Depression, Hx Post Traumatic Stress Disorder


Traumatic Medical History: Reports: Hx Fractures - right hand


Past Surgical History: Denies: Hx Appendectomy, Hx Bowel Surgery, Hx 

Cholecystectomy, Hx Colostomy, Hx Coronary Artery Bypass Graft, Hx Gastric 

Bypass Surgery, Hx Herniorrhaphy, Hx Pacemaker, Hx Tonsillectomy





- Immunizations


Immunizations up to date: Yes


Hx Diphtheria, Pertussis, Tetanus Vaccination: Yes


History of Influenza Vaccine for 10/2017 - 3/2018 Season: Yes


Influenza Administration Date for 10/2017 - 3/2018 Season: 10/01/17





Physical Exam





- Vital signs


Vitals: 





 











Temp Pulse Resp BP Pulse Ox


 


 97.7 F   104 H  16   121/74   95 


 


 12/16/18 13:31  12/16/18 13:31  12/16/18 13:31  12/16/18 13:31  12/16/18 13:31














Course





- Vital Signs


Vital signs: 





 











Temp Pulse Resp BP Pulse Ox


 


 97.7 F   104 H  16   121/74   95 


 


 12/16/18 13:31  12/16/18 13:31  12/16/18 13:31  12/16/18 13:31  12/16/18 13:31














Doctor's Discharge





- Discharge


Referrals: 


KARLY GARCIA MD [Primary Care Provider] - Follow up as needed

## 2018-12-16 NOTE — ER DOCUMENT REPORT
ED General





- General


Chief Complaint: Skin Problem


Stated Complaint: OPEN SORES ON LEGS, DRAINAGE


Time Seen by Provider: 12/16/18 13:52


Mode of Arrival: Wheelchair


Information source: Patient, Relative


Notes: 





This is a 68-year-old man with hypertension, peripheral vascular disease, 

status post total knee replacement last Monday (6 days ago).  Patient is 

brought into the emergency room because of concerns of green discharge from the 

wound.  Patient's wife states that they were 2 areas on the lateral aspect of 

the knee joint outside of where the wound dressing is that had blisters and 

then had some drainage.  They deny fever.  They deny any increased pain.


TRAVEL OUTSIDE OF THE [place] IN LAST 30 DAYS: No





- HPI


Onset: Just prior to arrival


Onset/Duration: Gradual


Quality of pain: Other - Patient has had pain with range of motion after 

surgery that is expected.  He has not had any increased pain over the last few 

days.


Severity: Moderate


Pain Level: 2


Associated symptoms: Chest pain, Fever, Shortness of breath


Exacerbated by: Movement


Relieved by: Denies


Similar symptoms previously: Yes


Recently seen / treated by doctor: Yes





- Related Data


Allergies/Adverse Reactions: 


 





No Known Allergies Allergy (Verified 11/26/18 11:20)


 











Past Medical History





- General


Information source: Patient, Relative, [facility] Records





- Social History


Smoking Status: Never Smoker


Cigarette use (# per day): No


Chew tobacco use (# tins/day): No


Frequency of alcohol use: None


Drug Abuse: None


Lives with: Spouse/Significant other


Family History: None


Patient has suicidal ideation: No


Patient has homicidal ideation: No





- Past Medical History


Cardiac Medical History: Reports: Hx Hypercholesterolemia, Hx Hypertension


   Denies: Hx Atrial Fibrillation, Hx Congestive Heart Failure, Hx Coronary 

Artery Disease, Hx Heart Attack, Hx Peripheral Vascular Disease, Hx Pulmonary 

Embolism, Hx Heart Murmur


Pulmonary Medical History: Reports: Hx Bronchitis - FEW YEARS AGO 2013


   Denies: Hx Asthma, Hx COPD, Hx Pneumonia, Hx Respiratory Failure, Hx Sleep 

Apnea, Hx Tuberculosis


Neurological Medical History: Denies: Hx Cerebrovascular Accident, Hx Seizures


Endocrine Medical History: Reports: Hx Diabetes Mellitus Type 2 - edge of being 

diabetic.  Denies: Hx Hyperthyroidism, Hx Hypothyroidism


Renal/ Medical History: Reports: Hx Benign Prostatic Hyperplasia, Hx Kidney 

Stones.  Denies: Hx End Stage Renal Disease, Hx Peritoneal Dialysis


Malignancy Medical History: Denies Hx Lung Cancer


GI Medical History: Reports: Hx Gastroesophageal Reflux Disease.  Denies: Hx 

Crohn's Disease, Hx Hiatal Hernia, Hx Irritable Bowel, Hx Liver Failure, Hx 

Pancreatitis, Hx Ulcer


Musculoskeletal Medical History: Reports Hx Arthritis - OA, Denies Hx 

Fibromyalgia, Denies Hx Muscular Dystrophy


Psychiatric Medical History: 


   Denies: Hx Bipolar Disorder, Hx Depression, Hx Post Traumatic Stress Disorder


Traumatic Medical History: Reports: Hx Fractures - right hand


Past Surgical History: Denies: Hx Appendectomy, Hx Bowel Surgery, Hx 

Cholecystectomy, Hx Colostomy, Hx Coronary Artery Bypass Graft, Hx Gastric 

Bypass Surgery, Hx Herniorrhaphy, Hx Pacemaker, Hx Tonsillectomy





- Immunizations


Immunizations up to date: Yes


Hx Diphtheria, Pertussis, Tetanus Vaccination: Yes


Hx Pneumococcal Vaccination: 10/01/17





Review of Systems





- Review of Systems


Constitutional: denies: Chills, Fever


EENT: No symptoms reported


Cardiovascular: No symptoms reported


Respiratory: No symptoms reported


Gastrointestinal: No symptoms reported


Genitourinary: No symptoms reported


Male Genitourinary: No symptoms reported


Musculoskeletal: See HPI


Skin: See HPI


Hematologic/Lymphatic: No symptoms reported


Neurological/Psychological: No symptoms reported





Physical Exam





- Vital signs


Vitals: 


 











Temp Pulse Resp BP Pulse Ox


 


 97.7 F   104 H  16   121/74   95 


 


 12/16/18 13:31  12/16/18 13:31  12/16/18 13:31  12/16/18 13:31  12/16/18 13:31











Notes: 





Physical exam:


 


GENERAL: Patient is alert and oriented x3, no acute distress





HEAD: Atraumatic, normocephalic.





EYES: Pupils equal round and reactive to light, extraocular movements intact, 

sclera anicteric, conjunctiva are normal.





ENT:   Moist mucous membranes.





NECK: Normal range of motion, supple without obvious mass 





LUNGS: Breath sounds clear to auscultation bilaterally and equal.  No wheezes 

rales or rhonchi.





HEART: Regular rate and rhythm without murmurs, rubs or gallops.





ABDOMEN: Soft, normoactive bowel sounds.  No tenderness to palpation.  No 

guarding, no rebound.  No masses appreciated.





EXTREMITIES: 





Left lower extremity: Old scar from total knee replacement. Normal range of 

motion, no pitting or edema.  There is Refill.


Right lower extremity: Patient still has dressings on from the surgery.  There 

is swelling to the lower extremity that is expected after the knee replacement.

  Distal cap refill is good.  There are 2 small areas of abrasions on the right 

lateral knee outside the dressings which is where the patient's wife said was 

draining.  There is no drainage now and it looks very superficial and 

noninfected.  There is one area of erythema on the medial side of the knee 

outside the dressings that looks more like abrasion and does not appear 

infected at this time.





NEUROLOGICAL: Cranial nerves II through XII grossly intact.  Normal speech, 

moving all extremities.





PSYCH: Normal mood, normal affect.





SKIN: As mentioned above





Course





- Re-evaluation


Re-evalutation: 





12/16/18 19:28





Operative dressings were removed.  The wound site was inspected: Its dry and 

intact.  There is no pus drainage expressible.  There is mild erythema around 

the edges of the wound (which would be expected).  There is no increased warmth 

or obvious infection.  The wound site was cleaned with ChloraPrep and allowed 

to dry.  The wound was redressed with Xeroform followed by an operative 

dressing.





Discussed case with Dr. Garcia (clinical case as well as labs) who knows 

patient well.  We will follow-up with patient on Tuesday.





- Vital Signs


Vital signs: 


 











Temp Pulse Resp BP Pulse Ox


 


 97.8 F   85   18   113/66   95 


 


 12/16/18 16:56  12/16/18 16:56  12/16/18 16:56  12/16/18 16:56  12/16/18 16:56














- Laboratory


Result Diagrams: 


 12/16/18 14:30





 12/16/18 14:30


Laboratory results interpreted by me: 


 











  12/16/18 12/16/18





  14:30 14:30


 


WBC  13.9 H 


 


RBC  3.76 L 


 


Hgb  11.5 L 


 


Hct  33.7 L 


 


RDW  14.2 H 


 


Lymphocytes %  9.1 L 


 


Absolute Neutrophils  10.2 H 


 


Absolute Monocytes  1.7 H 


 


BUN   28 H


 


Glucose   167 H














- Diagnostic Test


Radiology reviewed: Image reviewed, Reports reviewed - No acute injury





Discharge





- Discharge


Clinical Impression: 


 Wound check, Dressing change





Condition: Stable


Disposition: HOME, SELF-CARE


Additional Instructions: 


As we discussed, I did discuss the wound with Dr. Garcia as well as the lab 

results.


The surgical wound itself was without any pus drainage.


The dressings were changed


He does want to see you in the office on Tuesday.


In the meantime, you can put some bacitracin on the external areas that had 

previously shown blistered.


If you develop any fever (temperature greater than 100.5), pain, worsening 

redness, worsening swelling: Return to the ER at once.


Prescriptions: 


Bacitracin Zinc [Bacitracin Oint 15 gm] 1 applic TP DAILY #1 tube


Referrals: 


KARLY GARCIA MD [Primary Care Provider] - 12/18/18

## 2019-08-04 ENCOUNTER — HOSPITAL ENCOUNTER (EMERGENCY)
Dept: HOSPITAL 62 - ER | Age: 69
LOS: 1 days | Discharge: HOME | End: 2019-08-05
Payer: MEDICARE

## 2019-08-04 DIAGNOSIS — Z96.653: ICD-10-CM

## 2019-08-04 DIAGNOSIS — R07.81: Primary | ICD-10-CM

## 2019-08-04 DIAGNOSIS — I10: ICD-10-CM

## 2019-08-04 DIAGNOSIS — Z87.442: ICD-10-CM

## 2019-08-04 DIAGNOSIS — E78.00: ICD-10-CM

## 2019-08-04 DIAGNOSIS — M25.511: ICD-10-CM

## 2019-08-04 LAB
ADD MANUAL DIFF: NO
ALBUMIN SERPL-MCNC: 4.6 G/DL (ref 3.5–5)
ALP SERPL-CCNC: 89 U/L (ref 38–126)
ANION GAP SERPL CALC-SCNC: 10 MMOL/L (ref 5–19)
AST SERPL-CCNC: 35 U/L (ref 17–59)
BASOPHILS # BLD AUTO: 0.1 10^3/UL (ref 0–0.2)
BASOPHILS NFR BLD AUTO: 0.7 % (ref 0–2)
BILIRUB DIRECT SERPL-MCNC: 0.4 MG/DL (ref 0–0.4)
BILIRUB SERPL-MCNC: 0.4 MG/DL (ref 0.2–1.3)
BUN SERPL-MCNC: 43 MG/DL (ref 7–20)
CALCIUM: 10.5 MG/DL (ref 8.4–10.2)
CHLORIDE SERPL-SCNC: 104 MMOL/L (ref 98–107)
CK MB SERPL-MCNC: 1.1 NG/ML (ref ?–4.55)
CK SERPL-CCNC: 106 U/L (ref 55–170)
CO2 SERPL-SCNC: 27 MMOL/L (ref 22–30)
EOSINOPHIL # BLD AUTO: 0.3 10^3/UL (ref 0–0.6)
EOSINOPHIL NFR BLD AUTO: 2.4 % (ref 0–6)
ERYTHROCYTE [DISTWIDTH] IN BLOOD BY AUTOMATED COUNT: 14.4 % (ref 11.5–14)
GLUCOSE SERPL-MCNC: 155 MG/DL (ref 75–110)
HCT VFR BLD CALC: 36.8 % (ref 37.9–51)
HGB BLD-MCNC: 12.5 G/DL (ref 13.5–17)
LYMPHOCYTES # BLD AUTO: 1.5 10^3/UL (ref 0.5–4.7)
LYMPHOCYTES NFR BLD AUTO: 12 % (ref 13–45)
MCH RBC QN AUTO: 31.3 PG (ref 27–33.4)
MCHC RBC AUTO-ENTMCNC: 34.1 G/DL (ref 32–36)
MCV RBC AUTO: 92 FL (ref 80–97)
MONOCYTES # BLD AUTO: 1.2 10^3/UL (ref 0.1–1.4)
MONOCYTES NFR BLD AUTO: 9.3 % (ref 3–13)
NEUTROPHILS # BLD AUTO: 9.4 10^3/UL (ref 1.7–8.2)
NEUTS SEG NFR BLD AUTO: 75.6 % (ref 42–78)
PLATELET # BLD: 197 10^3/UL (ref 150–450)
POTASSIUM SERPL-SCNC: 4.4 MMOL/L (ref 3.6–5)
PROT SERPL-MCNC: 8.2 G/DL (ref 6.3–8.2)
RBC # BLD AUTO: 4.01 10^6/UL (ref 4.35–5.55)
TOTAL CELLS COUNTED % (AUTO): 100 %
TROPONIN I SERPL-MCNC: < 0.012 NG/ML
WBC # BLD AUTO: 12.5 10^3/UL (ref 4–10.5)

## 2019-08-04 PROCEDURE — 71046 X-RAY EXAM CHEST 2 VIEWS: CPT

## 2019-08-04 PROCEDURE — 80053 COMPREHEN METABOLIC PANEL: CPT

## 2019-08-04 PROCEDURE — 82550 ASSAY OF CK (CPK): CPT

## 2019-08-04 PROCEDURE — 36415 COLL VENOUS BLD VENIPUNCTURE: CPT

## 2019-08-04 PROCEDURE — 82553 CREATINE MB FRACTION: CPT

## 2019-08-04 PROCEDURE — 84484 ASSAY OF TROPONIN QUANT: CPT

## 2019-08-04 PROCEDURE — 85379 FIBRIN DEGRADATION QUANT: CPT

## 2019-08-04 PROCEDURE — 94640 AIRWAY INHALATION TREATMENT: CPT

## 2019-08-04 PROCEDURE — 93010 ELECTROCARDIOGRAM REPORT: CPT

## 2019-08-04 PROCEDURE — 93005 ELECTROCARDIOGRAM TRACING: CPT

## 2019-08-04 PROCEDURE — 99284 EMERGENCY DEPT VISIT MOD MDM: CPT

## 2019-08-04 PROCEDURE — 85025 COMPLETE CBC W/AUTO DIFF WBC: CPT

## 2019-08-04 NOTE — ER DOCUMENT REPORT
ED Medical Screen (RME)





- General


Chief Complaint: Chest Pain


Stated Complaint: CHEST PAIN


Time Seen by Provider: 08/04/19 18:52


Primary Care Provider: 


KARLY GARCIA MD [Primary Care Provider] - Follow up as needed


Notes: 





Patient is a 69-year-old male with a past medical history of arthritis who 

presents to the emergency department with chest pain.  His symptoms started at 

1600 today.  He states that it hurts for him to take a deep breath in.  His pain

is in his bilateral chest.  Patient states that he did some yard work yesterday 

and felt like he may have overdone it.  Patient has arthritis and he takes 

Tylenol with codeine and ibuprofen for his arthritis.  He also takes some 

vitamins.  Denies any other past medical history.





Exam: Inspiratory and expiratory wheezes and right upper lobe.





I have greeted and performed a rapid initial assessment of this patient.  A 

comprehensive ED assessment and evaluation of the patient, analysis of test 

results and completion of medical decision making process will be conducted by 

an additional ED providers.


TRAVEL OUTSIDE OF THE [place] IN LAST 30 DAYS: No





- Related Data


Allergies/Adverse Reactions: 


                                        





No Known Allergies Allergy (Verified 08/04/19 18:42)


   











Past Medical History





- Past Medical History


Cardiac Medical History: Reports: Hx Hypercholesterolemia, Hx Hypertension


   Denies: Hx Atrial Fibrillation, Hx Congestive Heart Failure, Hx Coronary 

Artery Disease, Hx Heart Attack, Hx Peripheral Vascular Disease, Hx Pulmonary 

Embolism, Hx Heart Murmur


Pulmonary Medical History: Reports: Hx Bronchitis - FEW YEARS AGO 2013


   Denies: Hx Asthma, Hx COPD, Hx Pneumonia, Hx Respiratory Failure, Hx Sleep 

Apnea, Hx Tuberculosis


Neurological Medical History: Denies: Hx Cerebrovascular Accident, Hx Seizures


Endocrine Medical History: Reports: Hx Diabetes Mellitus Type 2 - edge of being 

diabetic.  Denies: Hx Hyperthyroidism, Hx Hypothyroidism


Renal/ Medical History: Reports: Hx Benign Prostatic Hyperplasia, Hx Kidney 

Stones.  Denies: Hx End Stage Renal Disease, Hx Peritoneal Dialysis


Malignancy Medical History: Denies Hx Lung Cancer


GI Medical History: Reports: Hx Gastroesophageal Reflux Disease.  Denies: Hx 

Crohn's Disease, Hx Hiatal Hernia, Hx Irritable Bowel, Hx Liver Failure, Hx 

Pancreatitis, Hx Ulcer


Musculoskeltal Medical History: Reports Hx Arthritis - OA, Denies Hx 

Fibromyalgia, Denies Hx Muscular Dystrophy


Psychiatric Medical History: 


   Denies: Hx Bipolar Disorder, Hx Depression, Hx Post Traumatic Stress Disorder


Traumatic Medical History: Reports: Hx Fractures - right hand


Past Surgical History: Denies: Hx Appendectomy, Hx Bowel Surgery, Hx 

Cholecystectomy, Hx Colostomy, Hx Coronary Artery Bypass Graft, Hx Gastric 

Bypass Surgery, Hx Herniorrhaphy, Hx Pacemaker, Hx Tonsillectomy





- Immunizations


Immunizations up to date: Yes


Hx Diphtheria, Pertussis, Tetanus Vaccination: Yes


History of Influenza Vaccine for 10/2017 - 3/2018 Season: Yes


Influenza Administration Date for 10/2017 - 3/2018 Season: 10/01/17





Physical Exam





- Vital signs


Vitals: 





                                        











Temp Pulse Resp BP Pulse Ox


 


 97.9 F   97   18   138/71 H  95 


 


 08/04/19 18:56  08/04/19 18:56  08/04/19 18:56  08/04/19 18:56  08/04/19 18:56














Course





- Vital Signs


Vital signs: 





                                        











Temp Pulse Resp BP Pulse Ox


 


 97.9 F   97   18   138/71 H  95 


 


 08/04/19 18:56  08/04/19 18:56  08/04/19 18:56  08/04/19 18:56  08/04/19 18:56














Doctor's Discharge





- Discharge


Referrals: 


KARLY GARCIA MD [Primary Care Provider] - Follow up as needed

## 2019-08-04 NOTE — ER DOCUMENT REPORT
ED General





- General


Chief Complaint: Chest Pain


Stated Complaint: CHEST PAIN


Time Seen by Provider: 08/04/19 18:52


Primary Care Provider: 


REBECCA CARCAMO MD [Primary Care Provider] - 08/07/19


Notes: 


Patient is a pleasant 69-year-old male who presents with complaint of pain to 

his right shoulder and into his right chest.  Patient says the pain was 

initially constant but made much worse with taking a deep breath.  No he does 

not have any pain at rest but he still has pain with any deep breathing.  He 

denies any recent fevers or infections.  He does have some left shoulder pain 

but this has been chronic for several months and is unchanged.  No abdominal 

pain.  No vomiting.  No history of coronary disease.  No history of diabetes.  

No history of hypertension.  His primary care physician is Dr. Carcamo.  Patient t

akes pain medication such as Tylenol codeine and NSAIDs for chronic knee pain.  

Patient is a former smoker but has not smoked now for many years.  No other 

complaints at this time.


 


TRAVEL OUTSIDE OF THE [place] IN LAST 30 DAYS: No





- Related Data


Allergies/Adverse Reactions: 


                                        





No Known Allergies Allergy (Verified 08/04/19 18:42)


   











Past Medical History





- Social History


Smoking Status: Former Smoker


Frequency of alcohol use: None


Drug Abuse: None


Family History: None


Patient has suicidal ideation: No


Patient has homicidal ideation: No





- Past Medical History


Cardiac Medical History: Reports: Hx Hypercholesterolemia, Hx Hypertension


   Denies: Hx Atrial Fibrillation, Hx Congestive Heart Failure, Hx Coronary 

Artery Disease, Hx Heart Attack, Hx Peripheral Vascular Disease, Hx Pulmonary 

Embolism, Hx Heart Murmur


Pulmonary Medical History: Reports: Hx Bronchitis - FEW YEARS AGO 2013


   Denies: Hx Asthma, Hx COPD, Hx Pneumonia, Hx Respiratory Failure, Hx Sleep 

Apnea, Hx Tuberculosis


Neurological Medical History: Denies: Hx Cerebrovascular Accident, Hx Seizures


Endocrine Medical History: Reports: Hx Diabetes Mellitus Type 2 - edge of being 

diabetic.  Denies: Hx Hyperthyroidism, Hx Hypothyroidism


Renal/ Medical History: Reports: Hx Benign Prostatic Hyperplasia, Hx Kidney 

Stones.  Denies: Hx End Stage Renal Disease, Hx Peritoneal Dialysis


Malignancy Medical History: Denies Hx Lung Cancer


GI Medical History: Reports: Hx Gastroesophageal Reflux Disease.  Denies: Hx 

Crohn's Disease, Hx Hiatal Hernia, Hx Irritable Bowel, Hx Liver Failure, Hx Panc

reatitis, Hx Ulcer


Musculoskeletal Medical History: Reports Hx Arthritis - OA, Denies Hx 

Fibromyalgia, Denies Hx Muscular Dystrophy


Psychiatric Medical History: 


   Denies: Hx Bipolar Disorder, Hx Depression, Hx Post Traumatic Stress Disorder


Traumatic Medical History: Reports: Hx Fractures - right hand


Past Surgical History: Reports: Hx Orthopedic Surgery - bilateral knee 

replacement.  Denies: Hx Appendectomy, Hx Bowel Surgery, Hx Cholecystectomy, Hx 

Colostomy, Hx Coronary Artery Bypass Graft, Hx Gastric Bypass Surgery, Hx 

Herniorrhaphy, Hx Pacemaker, Hx Tonsillectomy





- Immunizations


Immunizations up to date: Yes


Hx Diphtheria, Pertussis, Tetanus Vaccination: Yes


Hx Pneumococcal Vaccination: 10/01/17





Review of Systems





- Review of Systems


Notes: 





My Normal Review Basic





REVIEW OF SYSTEMS:


CONSTITUTIONAL :  Denies fever,  chills, or sweats.  Denies recent illness.


EENT:   Denies eye, ear, throat, or mouth pain or symptoms.  Denies nasal or 

sinus congestion.


CARDIOVASCULAR: Right-sided pain in chest with deep breathing.


RESPIRATORY:  Denies cough, cold, or chest congestion.  Denies shortness of 

breath, difficulty breathing, or wheezing.


GASTROINTESTINAL:  Denies abdominal pain.  Denies nausea, vomiting, or diarrhea.




MUSCULOSKELETAL:  Denies neck or back pain or joint pain or swelling.


SKIN:   Denies rash or skin lesions.


NEUROLOGICAL:  Denies altered mental status or loss of consciousness.  Denies 

headache.  Denies weakness or paralysis or loss of use of either side.  Denies 

problems with gait or speech.  Denies sensory or motor loss.


ALL OTHER SYSTEMS REVIEWED AND NEGATIVE.





Physical Exam





- Vital signs


Vitals: 


                                        











Temp Pulse Resp BP Pulse Ox


 


 97.9 F   97   18   138/71 H  95 


 


 08/04/19 18:56  08/04/19 18:56  08/04/19 18:56  08/04/19 18:56  08/04/19 18:56














- Notes


Notes: 





General Appearance: Well nourished, alert, cooperative, no acute distress, no 

obvious discomfort.  Well-appearing.


Vitals: reviewed, See vital signs table.


Head: no swelling or tenderness to the head


Eyes: PERRL, EOMI, Conjuctiva clear


Mouth: No decreasd moisture


S wall: No reproducible pain to palpation of chest wall.


Lungs: No wheezing, No rales, No rhonci, No accessory muscle use, good air 

exchange bilaterally.


Heart: Normal rate, Regular rythm, No murmur, no rub


Abdomen: Normal BS, soft, No rigidity, No abdominal tenderness, No guarding, no 

rebound, no abdominal masses, no organomegaly


Extremities: strength 5/5 in all extremities, good pulses in all extremities, no

swelling or tenderness in the extremities with palpation, no edema.


Skin: warm, dry, appropriate color, no rash


Neuro: speech clear, oriented x 3, normal affect, responds appropriately to 

questions.





Course





- Re-evaluation


Re-evalutation: 





08/05/19 06:35


Pain on exam is very pleuritic.  I think coronary disease is unlikely.  His 

heart score 3.  I did explain to him the heart score 3 mean that his risk of 

serious cardiac event next 6 weeks proximal 1/2%.  I informed him I think is saf

e for outpatient follow-up.  Patient is agreeable follow-up with his outpatient 

doctor, Dr. Carcamo.  Suspect most likely is pleurisy.  His pain only occurs with 

deep breathing at this time.  Patient is not tachycardic, not tachypnea, not 

hypoxemic and he has negative d-dimer and therefore I do not suspect PE.  Feel 

that the patient is safe to be discharged home.  I strongly encouraged him to 

return to ER if he has worsening symptoms or feels unwell.  





Dictation of this chart was performed using voice recognition software; 

therefore, there may be some unintended grammatical errors.





- Vital Signs


Vital signs: 


                                        











Temp Pulse Resp BP Pulse Ox


 


 98.4 F   79   15   106/67   99 


 


 08/05/19 01:55  08/05/19 01:55  08/05/19 01:55  08/05/19 01:55  08/05/19 01:55














- Laboratory


Result Diagrams: 


                                 08/04/19 20:30





                                 08/04/19 20:30


Laboratory results interpreted by me: 


                                        











  08/04/19 08/04/19





  20:30 20:30


 


WBC  12.5 H 


 


RBC  4.01 L 


 


Hgb  12.5 L 


 


Hct  36.8 L 


 


RDW  14.4 H 


 


Lymphocytes %  12.0 L 


 


Absolute Neutrophils  9.4 H 


 


BUN   43 H


 


Creatinine   1.27 H


 


Est GFR (Non-Af Amer)   56 L


 


Glucose   155 H


 


Calcium   10.5 H














- EKG Interpretation by Me


Additional EKG results interpreted by me: 





08/04/19 22:20


EKG is reviewed and interpreted by me.  EKG shows sinus rhythm with a rate of 95

bpm.  No ST segment elevation or depression.  No ischemic T wave inversions.  

Patient does have right bundle branch block.  This is unchanged comparison 

previous EKG from November 13, 2018.  NJ interval is within normal range.  QRS 

duration and QT intervals are prolonged.





Discharge





- Discharge


Clinical Impression: 


Chest pain


Qualifiers:


 Chest pain type: pleurodynia Qualified Code(s): R07.81 - Pleurodynia





Condition: Good


Disposition: HOME, SELF-CARE


Additional Instructions: 


As discussed with you I suspect that your pain is related to pleurisy.  This is 

pain due to inflammation of the lungs that is worse with taking a deep breath.  

Please do not overexert yourself over the next several days.  Please follow-up 

with your primary care doctor this week for reevaluation.  Currently work-up 

looking at your heart does not show any concerning findings; however, you should

still have a low threshold to return to the ER if you have worsening chest pain,

difficulty breathing, fevers, coughing up of blood, or feel like you are 

worsening in any way.  You can take ibuprofen 600 mg up to every 6 hours as 

needed for pain.








Referrals: 


REBECCA CARCAMO MD [Primary Care Provider] - 08/07/19

## 2019-08-04 NOTE — RADIOLOGY REPORT (SQ)
EXAM DESCRIPTION: 



XR CHEST 2 VIEWS



COMPLETED DATE/TME:  08/04/2019 19:08



CLINICAL HISTORY: 



69 years, Male, chest pain



COMPARISON:

Multiple priors, most recent from 11/13/2018



NUMBER OF VIEWS:

Two



TECHNIQUE:

Frontal and lateral radiograph of the chest were obtained



LIMITATIONS:

None.



FINDINGS:



Cardiac and mediastinal contours are stable. Bands of opacity are

noted about both lungs, indicating scar given their stability

from 8/23/2018. There is elevation of the right hemidiaphragm,

also unchanged. No pleural effusion or pneumothorax.



IMPRESSION:



Stable appearance of the chest without evidence of acute disease.

 



copyright 2011 [employer]- All Rights Reserved

## 2019-08-04 NOTE — EKG REPORT
SEVERITY:- ABNORMAL ECG -

SINUS RHYTHM

RIGHT BUNDLE BRANCH BLOCK

:

Confirmed by: Radha Vasquez MD 04-Aug-2019 23:12:18

## 2019-08-05 VITALS — SYSTOLIC BLOOD PRESSURE: 106 MMHG | DIASTOLIC BLOOD PRESSURE: 67 MMHG

## 2020-11-12 ENCOUNTER — HOSPITAL ENCOUNTER (OUTPATIENT)
Dept: HOSPITAL 62 - RAD | Age: 70
End: 2020-11-12
Attending: INTERNAL MEDICINE
Payer: MEDICARE

## 2020-11-12 DIAGNOSIS — K21.9: Primary | ICD-10-CM

## 2020-11-12 DIAGNOSIS — R13.10: ICD-10-CM

## 2020-11-12 PROCEDURE — 74230 X-RAY XM SWLNG FUNCJ C+: CPT

## 2020-11-12 NOTE — ST MODIFIED BARIUM SWALLOW
Recommendation





- Recommendations


Recommendations: No diet change recommendations, normal oral and pharyngeal 

phase of the swallow noted.





Medical Diagnoses





- Medical Diagnoses


Medical Diagnosis Description & ICD-10 Code(s): K21.9, R13.10


Other Medical Diagnoses/Co-Morbidities: per patient report: reflux





- ICD-10 Tx Diagnosis Coding


(1) Gastro-esophageal reflux disease without esophagitis


ICD-10 Code(s): K21.9 - GASTRO-ESOPHAGEAL REFLUX DISEASE WITHOUT ESOPHAGITIS   





(2) Dysphagia, unspecified


ICD-10 Code(s): R13.10 - DYSPHAGIA, UNSPECIFIED   





ST Modified Barium Swallow





- General


Date: 11/12/20


Referring Physician: Dr. Bruton


Risks/Precautions: Falls


Date of Onset: 12/01/19 - approximate onset date


Reason for Referral: cough





- History


History obtained from: Patient


-: Medical - Patient provided his own medical history this day. Patient reports 

having frequent coughing since December of 2019, reports that it is worse in the

morning. He does report this improving recently however due to new medication 

from his doctor, was not able to name medication. Patient has not had recent 

pneumonia and does not report pertinent neurological history.


Medications: patient unable to give complete medication list, does report 

omeprazole and pills "for his chest".


Allergies: none reported





- Functional Status


Prior Functional Status: INDEPENDENT: feeding - independent


Current Functional Limitations: feeding - independent





- Subjective


Patient/caregiver goal(s): r/o aspiration


Cognitive-Linguistic Function: Functional


Speech Intelligibility: WNL


Current Nutritional Means: PO


Current PO diet: Regular


Current symptoms: Coughing


Pain: Patient reports, 0/5





- Objective


Assessment: Upright, Left Lateral





- Food Trials Used


Food trials used: Thin liquids, Pureed, Regular


The patient: Was Able to Self Feed





- Oral-Motor Skills


Velo-pharyngeal function: Unremarkable


Laryngeal Function: clear voicing





- Assessment


Oral prep: Normal


Labial closure: Adequate


Leakage: None


Mastication: Adequate


Lingual Movement: Normal


Oral stage: Normal for this Procedure





- Pharyngeal Stage


Initiation of Pharyngeal Stage Reflex: Normal


Decreased laryngeal elevation: No


Reduced Velopharyngeal Closure: no


Reduced pressure generation: No


reduced tongue-based retraction: No


Pre-swallow pooling in valleculae: None


Pre-Swallow pooling in pyriforms: None


Reduced Thyro-Hyoid approximation: No


Reduced epiglottic excursion: No


Reduced pharyngeal peristalsis/contraction: No


Post-swallow residulas vallecular: None


Post-Swallow residuals in pyriforms: None


Reduced Cricopharyngeal opening: No





- Fall Risk Assessment


Medications/Conditions that increase fall risks include: Antidepressants, 

sedatives, anti-arrhythmic, diuretic, benzodiazipenes, neuroleptics.  BP 

regulation problems, cardiac problems, balance or gait deficits, neurological 

problems.


Fall Risk Actions Taken: No action needed





- Behavioral Observations


During evaluation process patient: was cooperative, able to answer questions





- Treatment / Educational Needs:


Treatment/Education Needs: Treatment consisted of patient education on the role 

of the Speech Pathologist.  Patient's plan of care and golas were communicated 

as well as scheduling and attendance policies.  Recommendations for initial home

program were shared.  Patient demonstrated understanding and verbalized 

agreement.





- Impression/Summary


Laryngeal Penetration: No


Tracheal Aspiration: no


Patient presents with: Normal swallow at eval


Risk of Aspiration: Minimal





- Recommendations


Solid diet recommendations: Regular


Liquid Diet Modification: Thin


Dysphagia therapy with SLP: no


Information, Precautions and Recommendations: Patient (Written)


Other recommendations: 


May wish to see ENT if chronic cough persists.





- Time


Total Time: 30





- Plan of Care


Strategies to optimize patient understanding include:: ongoing assessment of 

educational needs, implementation of educational strategies, and re-education.





- -


-: Thank you for the opportunity to work with this patient and his/her family.  

Should you have any questions about this patient's plan or progress, I can be 

reached at [phone].

## 2020-11-12 NOTE — RADIOLOGY REPORT (SQ)
EXAM DESCRIPTION:  COOKIE SWALLOW



IMAGES COMPLETED DATE/TIME:  11/12/2020 8:34 am



REASON FOR STUDY:  GASTRO-ESOPHAGEAL REFLUX DISEASE WITHOUT ESOPHAGITIS K21.9  GASTRO-ESOPHAGEAL REFL
UX DISEASE WITHOUT ESOPHAGITIS difficulty swallowing



COMPARISON:  None.



TECHNIQUE:  Videofluoroscopic swallowing examination was performed in conjunction with speech patholo
gy.  Videofluoroscopic imaging was obtained and reviewed and these are the findings:



RADIATION DOSE:  1.1 minutes of fluoroscopy was used.

1 images saved to PACS.



LIMITATIONS:  None



FINDINGS:  The patient was brought into the fluoro room and placed upright on a modified barium swall
ow chair.  The patient was then given multiple consistencies mixed with barium to swallow under live 
fluoroscopic video guidance.  According to the Speech Pathologist there was no penetration or aspirat
ion.



IMPRESSION:  NO EVIDENCE OF PENETRATION OR ASPIRATION. PLEASE SEE SPEECH PATHOLOGIST REPORT FOR OTHER
 FINDINGS AND RECOMMENDATIONS.



COMMENT:  Quality :  Final reports for procedures using fluoroscopy that document radiation exp
osure indices, or exposure time and number of fluorographic images (if radiation exposure indices are
 not available)



TECHNICAL DOCUMENTATION:  JOB ID: [id number]

 2011 [employer]- All Rights Reserved



Reading location - IP/workstation name: [place] [id number]

## 2020-11-13 NOTE — XMS REPORT
Patient Summary Document

                          Created on:2020



Patient:JOSE RAMON NICOLAS

Sex:Male

:1950

External Reference #:[id number]





Demographics







                          Address                   [address]



                                                    [place] [address]

 

                          Home Phone                [phone]

 

                          Mobile Phone              [phone]

 

                          Email Address             [email or web address]

 

                          Preferred Language        English

 

                          Marital Status            Unknown

 

                          Denominational Affiliation     Unknown

 

                          Race                      Unknown

 

                          Additional Race(s)        White

 

                          Ethnic Group              Unknown









Author







                          Organization              [facility]

 

                          Address                   [facility] [address] [place] [id number]

 

                          Phone                     [phone]









Care Team Providers







                    Name                Role                Phone

 

                    Unavailable         Unavailable         Unavailable









Allergies, Adverse Reactions, Alerts

This patient has no known allergies or adverse reactions.



Medications







       Ordered Filled Start  Stop   Current Ordering Indication Dosage Frequency

 Signature

                          Comments                  Components



      Medication Medication Date  Date  Medication? Clinician                   

(SIG)       



      Name  Name                                                        

 

      diclofenac                   No                            diclofenac     

  



      1 % topical                                                 1 %         



      gel VIRGIL 2                                                 topical       



      GRAMS EXT                                                 gel VIRGIL 2       



      AA BID UTD                                                 GRAMS EXT      

 



                                                            AA BID UTD       

 

      ibuprofen                   No                            ibuprofen       



      800 mg                                                 800 mg       



      tablet TAKE                                                 tablet       



      ONE TABLET                                                 TAKE ONE       



      BY MOUTH                                                 TABLET BY       



      WITH FOOD                                                 MOUTH WITH      

 



      OR MILK AS                                                 FOOD OR       



      NEEDED                                                 MILK AS       



      THREE TIMES                                                 NEEDED       



      A DAY                                                 THREE       



                                                            TIMES A       



                                                            DAY         

 

      piroxicam                   No                            piroxicam       



      20 mg                                                 20 mg       



      capsule TK                                                 capsule TK     

  



      1 C PO QD                                                 1 C PO QD       



      WITH FOOD                                                 WITH FOOD       

 

      Enteric                   No                1     Q1D   Enteric       



      Coated                                                 Coated       



      Aspirin 81                                                 Aspirin 81     

  



      mg                                                    mg          



      tablet,bettina                                                 tablet,del    

   



      yed release                                                 ayed        



      Take 1                                                 release       



      tablet                                                 Take 1       



      every day                                                 tablet       



      by oral                                                 every day       



      route.                                                 by oral       



                                                            route.       

 

      hydrocodone                   No                1     Q6H   hydrocodon    

   



      5                                                     e 5         



      mg-acetamin                                                 mg-acetami    

   



      ophen 325                                                 nophen 325      

 



      mg tablet                                                 mg tablet       



      Take 1                                                 Take 1       



      tablet                                                 tablet       



      every 6                                                 every 6       



      hours by                                                 hours by       



      oral route.                                                 oral        



                                                            route.       

 

      oxycodone 5                   No                1     Q5H   oxycodone     

  



      mg tablet                                                 5 mg        



      Take 1                                                 tablet       



      tablet                                                 Take 1       



      every 4-6                                                 tablet       



      hours by                                                 every 4-6       



      oral route.                                                 hours by      

 



                                                            oral        



                                                            route.       

 

      tramadol 50                   No                1     Q6H   tramadol      

 



      mg tablet                                                 50 mg       



      Take 1                                                 tablet       



      tablet                                                 Take 1       



      every 6                                                 tablet       



      hours by                                                 every 6       



      oral route                                                 hours by       



      as needed.                                                 oral route     

  



                                                            as needed.       

 

      amoxicillin                   No                            amoxicilli    

   



      500 mg                                                 n 500 mg       



      capsule                                                 capsule       

 

      azithromyci                   No                            azithromyc    

   



      n 500 mg                                                 in 500 mg       



      tablet                                                 tablet       

 

      prednisone                   No                            prednisone     

  



      20 mg                                                 20 mg       



      tablet                                                 tablet       

 

      acetaminoph                   No                            acetaminop    

   



      en 300                                                 hen 300       



      mg-codeine                                                 mg-codeine     

  



      30 mg                                                 30 mg       



      tablet                                                 tablet       

 

      atorvastati                   No                            atorvastat    

   



      n 10 mg                                                 in 10 mg       



      tablet Take                                                 tablet       



      by oral                                                 Take by       



      route for                                                 oral route      

 



      90 days.                                                 for 90       



                                                            days.       

 

      baclofen 10                   No                            baclofen      

 



      mg tablet                                                 10 mg       



      Take by                                                 tablet       



      oral route                                                 Take by       



      for 30                                                 oral route       



      days.                                                 for 30       



                                                            days.       

 

      lisinopril                   No                            lisinopril     

  



      20                                                    20          



      mg-hydrochl                                                 mg-hydroch    

   



      orothiazide                                                 lorothiazi    

   



      12.5 mg                                                 de 12.5 mg       



      tablet Take                                                 tablet       



      by oral                                                 Take by       



      route for                                                 oral route      

 



      90 days.                                                 for 90       



                                                            days.       

 

      Tylenol-Cod                   No                1     Q6H   Tylenol-Co    

   



      eine #3 300                                                 deine #3      

 



      mg-30 mg                                                 300 mg-30       



      tablet Take                                                 mg tablet     

  



      1 tablet                                                 Take 1       



      every 6                                                 tablet       



      hours by                                                 every 6       



      oral route                                                 hours by       



      as needed                                                 oral route      

 



      for 2 days.                                                 as needed     

  



                                                            for 2       



                                                            days.       







Problems







        Condition Condition Condition Status  Onset   Resolution Last    Treatin

g Comments



        Name    Details Category         Date    Date    Treatment Clinician 



                                                        Date            

 

        Cervical Cervical Problem Active  2019                          



        radiculopat Radiculopat                 1-19                            



        hy      hy                      00:00:                          



                                        00                              

 

        Localized, Localized, Problem Active  2019                          



        primary Primary                 0-29                            



        osteoarthri Osteoarthri                 00:00:                          



        tis of the tis of the                 00                              



        shoulder Shoulder                                                 



        region  Region                                                  

 

        Bursitis of Bursitis of Problem Active  2019                          



        left    Left                    0-29                            



        shoulder Shoulder                 00:00:                          



                                        00                              

 

        Pain in Pain in Problem Active  2018                          



        left knee Left Knee                 2-31                            



                                        00:00:                          



                                        00                              

 

        Pain in Pain in Problem Active  2018                          



        right knee Right Knee                 0-24                            



                                        00:00:                          



                                        00                              

 

        Joint   Joint   Problem Active  2018                          



        stiffness Stiffness                 0-12                            



                                        00:00:                          



                                        00                              

 

        Muscle  Muscle  Problem Active  2018                          



        weakness Weakness                 0-12                            



                                        00:00:                          



                                        00                              

 

        History of History of Problem Active  2018                          



        total knee Total Knee                 0-12                            



        arthroplast Arthroplast                 00:00:                          



        y       y                       00                              

 

        Implantatio Implantatio Problem Active  2018                          



        n of joint n of Joint                 0-12                            



        prosthesis Prosthesis                 00:00:                          



                                        00                              

 

        Hypercholes Hypercholes Problem Active                            



        terolemia terolemia                 8                            



                                        00:00:                          



                                        00                              

 

        Hypertensiv Hypertensiv Problem Active                            



        e disorder e Disorder                 8                            



                                        00:00:                          



                                        00                              

 

        Arthritis Arthritis Problem Active                            



                                        8                            



                                        00:00:                          



                                        00                              







Procedures







                Procedure       Date / Time Performed Performing Clinician Devic

e

 

                RADIOLOGIC EXAM KNEE 3 VIEWS 2020 00:00:00                

 

 

                XR, cervical spine 2019 00:00:00                 

 

                MRI, cervical spine, w/o contrast 2019 00:00:00           

      

 

                XR, shoulder    2019-10-29 00:00:00                 

 

                RADIOLOGIC EXAM KNEE 3 VIEWS 2019 00:00:00                

 

 

                RADIOLOGIC EXAM KNEE 3 VIEWS 2019 00:00:00                

 

 

                Total Knee Arthroplasty 2018-12-10 00:00:00                 

 

                Total Knee Arthroplasty (Surg) 2018-12-10 00:00:00              

   

 

                Knee Manipulation under Anesthesia 2018-10-31 00:00:00          

       



                (Surg)                                          

 

                Total Knee Arthroplasty 2018 00:00:00                 

 

                Total Knee Arthroplasty (Surg) 2018 00:00:00              

   

 

                Knee Surgery    2018 00:00:00                 







Results







           Test Description Test Time  Test Comments Text Results Atomic Results

 Result 

Comments









                basic metabolic panel 2018-10-31 00:00:00                 









                          Test Item    Value        Reference Range Comments









                calcium (test code = calcium) 9.9 mg/dL       8.4-10.2        

 

                glucose (test code = glucose) 114 mg/dL                 

 

                blood urea nitrogen (test code = blood urea nitrogen) 32 mg/dL  

      7-20            

 

                creatinine result (test code = creatinine result) 0.99 mg/dL    

  0.52-1.25       

 

                eGFR,non  (test code = eGFR,non  > 60    

        >60             



                american)                                       

 

                eGFR, (test code = eGFR,) > 60  

          >60             

 

                potassium (test code = potassium) 4.4 mmol/L      3.6-5.0       

  

 

                chloride (test code = chloride) 102 mmol/L                

 

                carbon dioxide (test code = carbon dioxide) 25 mmol/L       22-3

0           

 

                sodium (test code = sodium) 139.3 mmol/L    137-145         

 

                anion gap (test code = anion gap) 12              5-19          

  



Complete blood count (hemogram) panel - Blood by Automated count2018-10-31 
00:00:00





                Test Item       Value           Reference Range Comments

 

                white blood count (test code = white blood 7.7 10 3/uL     4.0-1

0.5        



                count)                                          

 

                red blood count (test code = red blood count) 4.17 10 6/uL    4.

35-5.55       

 

                hemoglobin (test code = hemoglobin) 13.3 g/dL       13.5-17.0   

    

 

                hematocrit (test code = hematocrit) 38.6 %          37.9-51.0   

    

 

                mean corpuscular volume (test code = mean 93 fL           80-97 

          



                corpuscular volume)                                 

 

                mean corpuscular hemoglobin (test code = mean 31.9 pg         27

.0-33.4       



                corpuscular hemoglobin)                                 

 

                mean corpuscular HGB conc (test code = mean 34.4 g/dL       32.0

-36.0       



                corpuscular HGB conc)                                 

 

                red cell distribution width (test code = red 13.9 %          11.

5-14.0       



                cell distribution width)                                 

 

                platelet count (test code = platelet count) 147 10 3/uL     150-

450         



basic metabolic panel2018-10-31 00:00:00





                Test Item       Value           Reference Range Comments

 

                calcium (test code = calcium) 9.9 mg/dL       8.4-10.2        

 

                glucose (test code = glucose) 114 mg/dL                 

 

                blood urea nitrogen (test code = blood urea 32 mg/dL        7-20

            



                nitrogen)                                       

 

                creatinine result (test code = creatinine 0.99 mg/dL      0.52-1

.25       



                result)                                         

 

                eGFR,non  (test code = eGFR,non > 60            

>60             



                )                                 

 

                eGFR, (test code = eGFR, > 60            

>60             



                american)                                       

 

                potassium (test code = potassium) 4.4 mmol/L      3.6-5.0       

  

 

                chloride (test code = chloride) 102 mmol/L                

 

                carbon dioxide (test code = carbon dioxide) 25 mmol/L       22-3

0           

 

                sodium (test code = sodium) 139.3 mmol/L    137-145         

 

                anion gap (test code = anion gap) 12              5-19          

  



Complete blood count (hemogram) panel - Blood by Automated count2018-10-31 
00:00:00





                Test Item       Value           Reference Range Comments

 

                white blood count (test code = white blood 7.7 10 3/uL     4.0-1

0.5        



                count)                                          

 

                red blood count (test code = red blood count) 4.17 10 6/uL    4.

35-5.55       

 

                hemoglobin (test code = hemoglobin) 13.3 g/dL       13.5-17.0   

    

 

                hematocrit (test code = hematocrit) 38.6 %          37.9-51.0   

    

 

                mean corpuscular volume (test code = mean 93 fL           80-97 

          



                corpuscular volume)                                 

 

                mean corpuscular hemoglobin (test code = mean 31.9 pg         27

.0-33.4       



                corpuscular hemoglobin)                                 

 

                mean corpuscular HGB conc (test code = mean 34.4 g/dL       32.0

-36.0       



                corpuscular HGB conc)                                 

 

                red cell distribution width (test code = red 13.9 %          11.

5-14.0       



                cell distribution width)                                 

 

                platelet count (test code = platelet count) 147 10 3/uL     150-

450         



basic metabolic panel2018-10-31 00:00:00





                Test Item       Value           Reference Range Comments

 

                calcium (test code = calcium) 9.9 mg/dL       8.4-10.2        

 

                glucose (test code = glucose) 114 mg/dL                 

 

                blood urea nitrogen (test code = blood urea 32 mg/dL        7-20

            



                nitrogen)                                       

 

                creatinine result (test code = creatinine 0.99 mg/dL      0.52-1

.25       



                result)                                         

 

                eGFR,non  (test code = eGFR,non > 60            

>60             



                )                                 

 

                eGFR, (test code = eGFR, > 60            

>60             



                american)                                       

 

                potassium (test code = potassium) 4.4 mmol/L      3.6-5.0       

  

 

                chloride (test code = chloride) 102 mmol/L                

 

                carbon dioxide (test code = carbon dioxide) 25 mmol/L       22-3

0           

 

                sodium (test code = sodium) 139.3 mmol/L    137-145         

 

                anion gap (test code = anion gap) 12              5-19          

  



Complete blood count (hemogram) panel - Blood by Automated count2018-10-31 
00:00:00





                Test Item       Value           Reference Range Comments

 

                white blood count (test code = white blood 7.7 10 3/uL     4.0-1

0.5        



                count)                                          

 

                red blood count (test code = red blood count) 4.17 10 6/uL    4.

35-5.55       

 

                hemoglobin (test code = hemoglobin) 13.3 g/dL       13.5-17.0   

    

 

                hematocrit (test code = hematocrit) 38.6 %          37.9-51.0   

    

 

                mean corpuscular volume (test code = mean 93 fL           80-97 

          



                corpuscular volume)                                 

 

                mean corpuscular hemoglobin (test code = mean 31.9 pg         27

.0-33.4       



                corpuscular hemoglobin)                                 

 

                mean corpuscular HGB conc (test code = mean 34.4 g/dL       32.0

-36.0       



                corpuscular HGB conc)                                 

 

                red cell distribution width (test code = red 13.9 %          11.

5-14.0       



                cell distribution width)                                 

 

                platelet count (test code = platelet count) 147 10 3/uL     150-

450         



basic metabolic panel2018-10-31 00:00:00





                Test Item       Value           Reference Range Comments

 

                calcium (test code = calcium) 9.9 mg/dL       8.4-10.2        

 

                glucose (test code = glucose) 114 mg/dL                 

 

                blood urea nitrogen (test code = blood urea 32 mg/dL        7-20

            



                nitrogen)                                       

 

                creatinine result (test code = creatinine 0.99 mg/dL      0.52-1

.25       



                result)                                         

 

                eGFR,non  (test code = eGFR,non > 60            

>60             



                )                                 

 

                eGFR, (test code = eGFR, > 60            

>60             



                american)                                       

 

                potassium (test code = potassium) 4.4 mmol/L      3.6-5.0       

  

 

                chloride (test code = chloride) 102 mmol/L                

 

                carbon dioxide (test code = carbon dioxide) 25 mmol/L       22-3

0           

 

                sodium (test code = sodium) 139.3 mmol/L    137-145         

 

                anion gap (test code = anion gap) 12              5-19          

  



Complete blood count (hemogram) panel - Blood by Automated count2018-10-31 
00:00:00





                Test Item       Value           Reference Range Comments

 

                white blood count (test code = white blood 7.7 10 3/uL     4.0-1

0.5        



                count)                                          

 

                red blood count (test code = red blood count) 4.17 10 6/uL    4.

35-5.55       

 

                hemoglobin (test code = hemoglobin) 13.3 g/dL       13.5-17.0   

    

 

                hematocrit (test code = hematocrit) 38.6 %          37.9-51.0   

    

 

                mean corpuscular volume (test code = mean 93 fL           80-97 

          



                corpuscular volume)                                 

 

                mean corpuscular hemoglobin (test code = mean 31.9 pg         27

.0-33.4       



                corpuscular hemoglobin)                                 

 

                mean corpuscular HGB conc (test code = mean 34.4 g/dL       32.0

-36.0       



                corpuscular HGB conc)                                 

 

                red cell distribution width (test code = red 13.9 %          11.

5-14.0       



                cell distribution width)                                 

 

                platelet count (test code = platelet count) 147 10 3/uL     150-

450         



basic metabolic panel2018-10-31 00:00:00





                Test Item       Value           Reference Range Comments

 

                calcium (test code = calcium) 9.9 mg/dL       8.4-10.2        

 

                glucose (test code = glucose) 114 mg/dL                 

 

                blood urea nitrogen (test code = blood urea 32 mg/dL        7-20

            



                nitrogen)                                       

 

                creatinine result (test code = creatinine 0.99 mg/dL      0.52-1

.25       



                result)                                         

 

                eGFR,non  (test code = eGFR,non > 60            

>60             



                )                                 

 

                eGFR, (test code = eGFR, > 60            

>60             



                american)                                       

 

                potassium (test code = potassium) 4.4 mmol/L      3.6-5.0       

  

 

                chloride (test code = chloride) 102 mmol/L                

 

                carbon dioxide (test code = carbon dioxide) 25 mmol/L       22-3

0           

 

                sodium (test code = sodium) 139.3 mmol/L    137-145         

 

                anion gap (test code = anion gap) 12              5-19          

  



Complete blood count (hemogram) panel - Blood by Automated count2018-10-31 
00:00:00





                Test Item       Value           Reference Range Comments

 

                white blood count (test code = white blood 7.7 10 3/uL     4.0-1

0.5        



                count)                                          

 

                red blood count (test code = red blood count) 4.17 10 6/uL    4.

35-5.55       

 

                hemoglobin (test code = hemoglobin) 13.3 g/dL       13.5-17.0   

    

 

                hematocrit (test code = hematocrit) 38.6 %          37.9-51.0   

    

 

                mean corpuscular volume (test code = mean 93 fL           80-97 

          



                corpuscular volume)                                 

 

                mean corpuscular hemoglobin (test code = mean 31.9 pg         27

.0-33.4       



                corpuscular hemoglobin)                                 

 

                mean corpuscular HGB conc (test code = mean 34.4 g/dL       32.0

-36.0       



                corpuscular HGB conc)                                 

 

                red cell distribution width (test code = red 13.9 %          11.

5-14.0       



                cell distribution width)                                 

 

                platelet count (test code = platelet count) 147 10 3/uL     150-

450         







Assessments







                Condition Name  Status          Diagnosis Date  Treating Clinici

an

 

                History of total knee arthroplasty Active          2020 10

:58:00 

 

                Pain in right knee Active          2020 10:58:01 

 

                Pain in left knee Active          2020 10:58:01 

 

                Pain in left knee Active          2020 14:29:46 

 

                Pain in right knee Active          2020 14:29:46 

 

                History of total knee arthroplasty Active          2020 14

:29:47 

 

                Knee pain       Active          2020 14:34:24 

 

                Neck pain       Active          2019 08:46:16 

 

                Cervical radiculopathy Active          2019 09:23:24 

 

                Bursitis of left shoulder Active          2019 09:29:35 

 

                Localized, primary osteoarthritis of Active          2019-10-29 

14:52:30 



                the shoulder region                                 

 

                Shoulder pain   Active          2019-10-29 14:12:30 

 

                Bursitis of left shoulder Active          2019-10-29 14:52:11 

 

                Replacement of total knee joint Active          2019 12:42

:55 

 

                Pain in lower limb Active          2019 12:43:05 

 

                Pain in left knee Active          2019 09:58:25 

 

                Pain in right knee Active          2019 09:58:25 

 

                History of total knee arthroplasty Active          2019 09

:58:26 

 

                Muscle weakness Active          2019 07:50:26 

 

                Joint stiffness Active          2019 07:50:26 

 

                Pain in right knee Active          2019 07:50:26 

 

                History of total knee arthroplasty Active          2019 07

:50:26 

 

                Implantation of joint prosthesis Active          2019 07:5

0:26 

 

                Pain in left knee Active          2019 07:50:26 

 

                Muscle weakness Active          2019 12:28:26 

 

                Joint stiffness Active          2019 12:28:26 

 

                Pain in right knee Active          2019 12:28:26 

 

                History of total knee arthroplasty Active          2019 12

:28:26 

 

                Implantation of joint prosthesis Active          2019 12:2

8:26 

 

                Pain in left knee Active          2019 12:28:26 

 

                Muscle weakness Active          2019 10:02:22 

 

                Joint stiffness Active          2019 10:02:22 

 

                Pain in right knee Active          2019 10:02:22 

 

                History of total knee arthroplasty Active          2019 10

:02:22 

 

                Implantation of joint prosthesis Active          2019 10:0

2:22 

 

                Pain in left knee Active          2019 10:02:22 

 

                Muscle weakness Active          2019 11:58:16 

 

                Joint stiffness Active          2019 11:58:16 

 

                Pain in right knee Active          2019 11:58:16 

 

                History of total knee arthroplasty Active          2019 11

:58:16 

 

                Implantation of joint prosthesis Active          2019 11:5

8:16 

 

                Pain in left knee Active          2019 11:58:16 

 

                Pain in right knee Active          2019 10:22:39 

 

                Pain in left knee Active          2019 10:22:40 

 

                Replacement of total knee joint Active          2019 12:09

:55 

 

                Pain in right knee Active          2019 10:22:39 

 

                Pain in left knee Active          2019 10:22:40 

 

                Replacement of total knee joint Active          2019 12:09

:55 

 

                Muscle weakness Active          2019 08:34:40 

 

                Joint stiffness Active          2019 08:34:40 

 

                Pain in right knee Active          2019 08:34:40 

 

                History of total knee arthroplasty Active          2019 08

:34:40 

 

                Implantation of joint prosthesis Active          2019 08:3

4:40 

 

                Pain in left knee Active          2019 08:34:40 

 

                Muscle weakness Active          2019 08:34:40 

 

                Joint stiffness Active          2019 08:34:40 

 

                Pain in right knee Active          2019 08:34:40 

 

                History of total knee arthroplasty Active          2019 08

:34:40 

 

                Implantation of joint prosthesis Active          2019 08:3

4:40 

 

                Pain in left knee Active          2019 08:34:40 

 

                Implantation of joint prosthesis Active          2019 08:3

0:12 

 

                Pain in left knee Active          2019 08:30:12 

 

                Muscle weakness Active          2019 08:30:12 

 

                Joint stiffness Active          2019 08:30:12 

 

                Pain in right knee Active          2019 08:30:12 

 

                History of total knee arthroplasty Active          2019 08

:30:12 

 

                Implantation of joint prosthesis Active          2019 08:3

0:12 

 

                Pain in left knee Active          2019 08:30:12 

 

                Muscle weakness Active          2019 08:30:12 

 

                Joint stiffness Active          2019 08:30:12 

 

                Pain in right knee Active          2019 08:30:12 

 

                History of total knee arthroplasty Active          2019 08

:30:12 

 

                Muscle weakness Active          2019 10:21:21 

 

                Joint stiffness Active          2019 10:21:21 

 

                Pain in right knee Active          2019 10:21:21 

 

                History of total knee arthroplasty Active          2019 10

:21:21 

 

                Implantation of joint prosthesis Active          2019 10:2

1:22 

 

                Pain in left knee Active          2019 10:21:22 

 

                Muscle weakness Active          2019 10:21:21 

 

                Joint stiffness Active          2019 10:21:21 

 

                Pain in right knee Active          2019 10:21:21 

 

                History of total knee arthroplasty Active          2019 10

:21:21 

 

                Implantation of joint prosthesis Active          2019 10:2

1:22 

 

                Pain in left knee Active          2019 10:21:22 

 

                Pain in right knee Active          2019 08:41:21 

 

                Replacement of total knee joint Active          2019 10:01

:06 

 

                Pain in right knee Active          2019 08:41:21 

 

                Replacement of total knee joint Active          2019 10:01

:06 

 

                Muscle weakness Active          2019 11:59:25 

 

                Joint stiffness Active          2019 11:59:25 

 

                Pain in right knee Active          2019 11:59:25 

 

                History of total knee arthroplasty Active          2019 11

:59:25 

 

                Implantation of joint prosthesis Active          2019 11:5

9:25 

 

                Pain in left knee Active          2019 11:59:25 

 

                Muscle weakness Active          2019 11:59:25 

 

                Joint stiffness Active          2019 11:59:25 

 

                Pain in right knee Active          2019 11:59:25 

 

                History of total knee arthroplasty Active          2019 11

:59:25 

 

                Implantation of joint prosthesis Active          2019 11:5

9:25 

 

                Pain in left knee Active          2019 11:59:25 

 

                Muscle weakness Active          2019 11:23:00 

 

                Joint stiffness Active          2019 11:23:00 

 

                Pain in right knee Active          2019 11:23:00 

 

                History of total knee arthroplasty Active          2019 11

:23:00 

 

                Implantation of joint prosthesis Active          2019 11:2

3:00 

 

                Pain in left knee Active          2019 11:23:00 

 

                Muscle weakness Active          2019 11:23:00 

 

                Joint stiffness Active          2019 11:23:00 

 

                Pain in right knee Active          2019 11:23:00 

 

                History of total knee arthroplasty Active          2019 11

:23:00 

 

                Implantation of joint prosthesis Active          2019 11:2

3:00 

 

                Pain in left knee Active          2019 11:23:00 

 

                Muscle weakness Active          2019 11:14:07 

 

                Joint stiffness Active          2019 11:14:07 

 

                Pain in right knee Active          2019 11:14:07 

 

                History of total knee arthroplasty Active          2019 11

:14:07 

 

                Implantation of joint prosthesis Active          2019 11:1

4:07 

 

                Pain in left knee Active          2019 11:14:07 

 

                Muscle weakness Active          2019 11:14:07 

 

                Joint stiffness Active          2019 11:14:07 

 

                Pain in right knee Active          2019 11:14:07 

 

                History of total knee arthroplasty Active          2019 11

:14:07 

 

                Implantation of joint prosthesis Active          2019 11:1

4:07 

 

                Pain in left knee Active          2019 11:14:07 

 

                Muscle weakness Active          2019-01-10 10:07:47 

 

                Joint stiffness Active          2019-01-10 10:07:47 

 

                Pain in right knee Active          2019-01-10 10:07:47 

 

                History of total knee arthroplasty Active          2019-01-10 10

:07:47 

 

                Implantation of joint prosthesis Active          2019-01-10 10:0

7:47 

 

                Pain in left knee Active          2019-01-10 10:07:47 

 

                Muscle weakness Active          2019-01-10 10:07:47 

 

                Joint stiffness Active          2019-01-10 10:07:47 

 

                Pain in right knee Active          2019-01-10 10:07:47 

 

                History of total knee arthroplasty Active          2019-01-10 10

:07:47 

 

                Implantation of joint prosthesis Active          2019-01-10 10:0

7:47 

 

                Pain in left knee Active          2019-01-10 10:07:47 

 

                Muscle weakness Active          2019 16:59:23 

 

                Joint stiffness Active          2019 16:59:23 

 

                Pain in right knee Active          2019 16:59:23 

 

                History of total knee arthroplasty Active          2019 16

:59:23 

 

                Implantation of joint prosthesis Active          2019 16:5

9:23 

 

                Pain in left knee Active          2019 16:59:23 

 

                Muscle weakness Active          2019 16:59:23 

 

                Joint stiffness Active          2019 16:59:23 

 

                Pain in right knee Active          2019 16:59:23 

 

                History of total knee arthroplasty Active          2019 16

:59:23 

 

                Implantation of joint prosthesis Active          2019 16:5

9:23 

 

                Pain in left knee Active          2019 16:59:23 

 

                Muscle weakness Active          2019 11:47:19 

 

                Joint stiffness Active          2019 11:47:19 

 

                Pain in right knee Active          2019 11:47:19 

 

                History of total knee arthroplasty Active          2019 11

:47:19 

 

                Implantation of joint prosthesis Active          2019 11:4

7:19 

 

                Pain in left knee Active          2019 11:47:19 

 

                Muscle weakness Active          2019 11:47:19 

 

                Joint stiffness Active          2019 11:47:19 

 

                Pain in right knee Active          2019 11:47:19 

 

                History of total knee arthroplasty Active          2019 11

:47:19 

 

                Implantation of joint prosthesis Active          2019 11:4

7:19 

 

                Pain in left knee Active          2019 11:47:19 

 

                Implantation of joint prosthesis Active          2018 12:0

8:52 

 

                History of total knee arthroplasty Active          2018 12

:08:53 

 

                Joint stiffness Active          2018 12:09:01 

 

                Pain in right knee Active          2018 12:09:14 

 

                Muscle weakness Active          2018 12:09:15 

 

                Pain in left knee Active          2018 12:09:35 

 

                Implantation of joint prosthesis Active          2018 12:0

8:52 

 

                History of total knee arthroplasty Active          2018 12

:08:53 

 

                Joint stiffness Active          2018 12:09:01 

 

                Pain in right knee Active          2018 12:09:14 

 

                Muscle weakness Active          2018 12:09:15 

 

                Pain in left knee Active          2018 12:09:35 

 

                Replacement of total knee joint Active          2018 13:31

:28 

 

                Replacement of total knee joint Active          2018 13:31

:28 

 

                Pain in right knee Active          2018 09:09:36 

 

                Replacement of total knee joint Active          2018 09:31

:33 

 

                Pain in right knee Active          2018 09:09:36 

 

                Replacement of total knee joint Active          2018 09:31

:33 

 

                Joint stiffness Active          2018 09:59:59 

 

                History of total knee arthroplasty Active          2018 09

:59:59 

 

                Muscle weakness Active          2018 09:59:59 

 

                Implantation of joint prosthesis Active          2018 09:5

9:59 

 

                Joint stiffness Active          2018 09:59:59 

 

                History of total knee arthroplasty Active          2018 09

:59:59 

 

                Muscle weakness Active          2018 09:59:59 

 

                Implantation of joint prosthesis Active          2018 09:5

9:59 

 

                Implantation of joint prosthesis Active          2018 09:3

5:54 

 

                History of total knee arthroplasty Active          2018 09

:35:54 

 

                Joint stiffness Active          2018 09:35:54 

 

                Muscle weakness Active          2018 09:35:54 

 

                Implantation of joint prosthesis Active          2018 09:3

5:54 

 

                History of total knee arthroplasty Active          2018 09

:35:54 

 

                Joint stiffness Active          2018 09:35:54 

 

                Muscle weakness Active          2018 09:35:54 

 

                Implantation of joint prosthesis Active          2018 11:1

8:33 

 

                History of total knee arthroplasty Active          2018 11

:18:33 

 

                Joint stiffness Active          2018 11:18:33 

 

                Muscle weakness Active          2018 11:18:33 

 

                Implantation of joint prosthesis Active          2018 11:1

8:33 

 

                History of total knee arthroplasty Active          2018 11

:18:33 

 

                Joint stiffness Active          2018 11:18:33 

 

                Muscle weakness Active          2018 11:18:33 

 

                Implantation of joint prosthesis Active          2018 09:2

3:00 

 

                History of total knee arthroplasty Active          2018 09

:23:00 

 

                Joint stiffness Active          2018 09:23:00 

 

                Muscle weakness Active          2018 09:23:00 

 

                Implantation of joint prosthesis Active          2018 09:2

3:00 

 

                History of total knee arthroplasty Active          2018 09

:23:00 

 

                Joint stiffness Active          2018 09:23:00 

 

                Muscle weakness Active          2018 09:23:00 

 

                Implantation of joint prosthesis Active          2018 10:4

0:15 

 

                History of total knee arthroplasty Active          2018 10

:40:15 

 

                Joint stiffness Active          2018 10:40:15 

 

                Muscle weakness Active          2018 10:40:15 

 

                Implantation of joint prosthesis Active          2018 10:4

0:15 

 

                History of total knee arthroplasty Active          2018 10

:40:15 

 

                Joint stiffness Active          2018 10:40:15 

 

                Muscle weakness Active          2018 10:40:15 

 

                Knee pain       Active          2018 14:02:22 

 

                Replacement of total knee joint Active          2018 15:55

:42 

 

                Knee pain       Active          2018 14:02:22 

 

                Replacement of total knee joint Active          2018 15:55

:42 

 

                Joint stiffness Active          2018 10:00:53 

 

                History of total knee arthroplasty Active          2018 10

:00:53 

 

                Muscle weakness Active          2018 10:00:53 

 

                Implantation of joint prosthesis Active          2018 10:0

0:53 

 

                Joint stiffness Active          2018 10:00:53 

 

                History of total knee arthroplasty Active          2018 10

:00:53 

 

                Muscle weakness Active          2018 10:00:53 

 

                Implantation of joint prosthesis Active          2018 10:0

0:53 

 

                Implantation of joint prosthesis Active          2018 10:0

8:47 

 

                History of total knee arthroplasty Active          2018 10

:08:47 

 

                Joint stiffness Active          2018 10:08:47 

 

                Muscle weakness Active          2018 10:08:47 

 

                Implantation of joint prosthesis Active          2018 10:0

8:47 

 

                History of total knee arthroplasty Active          2018 10

:08:47 

 

                Joint stiffness Active          2018 10:08:47 

 

                Muscle weakness Active          2018 10:08:47 

 

                Implantation of joint prosthesis Active          2018 10:2

3:40 

 

                History of total knee arthroplasty Active          2018 10

:23:40 

 

                Joint stiffness Active          2018 10:23:40 

 

                Muscle weakness Active          2018 10:23:40 

 

                Implantation of joint prosthesis Active          2018 10:2

3:40 

 

                History of total knee arthroplasty Active          2018 10

:23:40 

 

                Joint stiffness Active          2018 10:23:40 

 

                Muscle weakness Active          2018 10:23:40 

 

                Implantation of joint prosthesis Active          2018 10:0

0:50 

 

                History of total knee arthroplasty Active          2018 10

:00:50 

 

                Joint stiffness Active          2018 10:00:50 

 

                Muscle weakness Active          2018 10:00:50 

 

                Implantation of joint prosthesis Active          2018 10:0

0:50 

 

                History of total knee arthroplasty Active          2018 10

:00:50 

 

                Joint stiffness Active          2018 10:00:50 

 

                Muscle weakness Active          2018 10:00:50 

 

                Implantation of joint prosthesis Active          2018-10-30 10:3

6:32 

 

                History of total knee arthroplasty Active          2018-10-30 10

:36:32 

 

                Joint stiffness Active          2018-10-30 10:36:32 

 

                Muscle weakness Active          2018-10-30 10:36:32 

 

                Implantation of joint prosthesis Active          2018-10-30 10:3

6:32 

 

                History of total knee arthroplasty Active          2018-10-30 10

:36:32 

 

                Joint stiffness Active          2018-10-30 10:36:32 

 

                Muscle weakness Active          2018-10-30 10:36:32 

 

                Implantation of joint prosthesis Active          2018-10-26 10:5

7:17 

 

                History of total knee arthroplasty Active          2018-10-26 10

:57:17 

 

                Joint stiffness Active          2018-10-26 10:57:17 

 

                Muscle weakness Active          2018-10-26 10:57:17 

 

                Implantation of joint prosthesis Active          2018-10-26 10:5

7:17 

 

                History of total knee arthroplasty Active          2018-10-26 10

:57:17 

 

                Joint stiffness Active          2018-10-26 10:57:17 

 

                Muscle weakness Active          2018-10-26 10:57:17 

 

                Pain in right knee Active          2018-10-24 16:20:55 

 

                Osteoarthritis of knee Active          2018-10-25 11:32:18 

 

                Replacement of total knee joint Active          2018-10-25 11:32

:23 

 

                Pain in right knee Active          2018-10-24 16:20:55 

 

                Osteoarthritis of knee Active          2018-10-25 11:32:18 

 

                Replacement of total knee joint Active          2018-10-25 11:32

:23 

 

                Implantation of joint prosthesis Active          2018-10-25 11:2

2:39 

 

                History of total knee arthroplasty Active          2018-10-25 11

:22:39 

 

                Joint stiffness Active          2018-10-25 11:22:39 

 

                Muscle weakness Active          2018-10-25 11:22:39 

 

                Implantation of joint prosthesis Active          2018-10-25 11:2

2:39 

 

                History of total knee arthroplasty Active          2018-10-25 11

:22:39 

 

                Joint stiffness Active          2018-10-25 11:22:39 

 

                Muscle weakness Active          2018-10-25 11:22:39 

 

                Implantation of joint prosthesis Active          2018-10-19 09:5

6:28 

 

                History of total knee arthroplasty Active          2018-10-19 09

:56:28 

 

                Joint stiffness Active          2018-10-19 09:56:28 

 

                Muscle weakness Active          2018-10-19 09:56:28 

 

                Implantation of joint prosthesis Active          2018-10-19 09:5

6:28 

 

                History of total knee arthroplasty Active          2018-10-19 09

:56:28 

 

                Joint stiffness Active          2018-10-19 09:56:28 

 

                Muscle weakness Active          2018-10-19 09:56:28 

 

                Implantation of joint prosthesis Active          2018-10-16 10:3

9:11 

 

                History of total knee arthroplasty Active          2018-10-16 10

:39:11 

 

                Joint stiffness Active          2018-10-16 10:39:11 

 

                Muscle weakness Active          2018-10-16 10:39:11 

 

                Implantation of joint prosthesis Active          2018-10-16 10:3

9:11 

 

                History of total knee arthroplasty Active          2018-10-16 10

:39:11 

 

                Joint stiffness Active          2018-10-16 10:39:11 

 

                Muscle weakness Active          2018-10-16 10:39:11 







Encounters







        Start   End     Encounter Admission Attending Care    Care    Encounter



        Date/Time Date/Time Type    Type    Clinicians Facility Department ID

 

        2020 Pete Banuelos [id number]





        00:00:00 00:00:00 Sathish                 Surgical Surgical 65732



                        MD Jessica:                 [facility] 



                        [address] [place] [address],                                 



                        [address]                                   



                        [address],                                 



                        Ph. [phone]                                 

 

        2020 Pete Banuelos [id number]





        00:00:00 00:00:00 Sathish                 [facility] 36626



                        MD Jessica:                 [facility] 



                        [address] [place] [address],                                 



                        [address],                                 



                        [place], [place]                                   



                        [address],                                 



                        Ph. [phone]                                 

 

        2019 Pilo                 [facility]

[id number]



        00:00:00 00:00:00 Blake P.A.  , P.A.  [id number]



                        MD:                                  



                        [facility] [address],                                 



                        [place], [place]                                   



                        [address],                                 



                        Ph. [phone]                                 

 

        2019-10-29 2019-10-29 Pilo                 [facility]

[id number]



        00:00:00 00:00:00 Blake P.SHAYLA  , P.A.  [id number]



                        MD:                                  



                        [facility] [address],                                 



                        [facility], [place]                                   



                        [address],                                 



                        Ph. [phone]                                 

 

        2019 Pete Banuelos [id number]





        00:00:00 00:00:00 Sathish                 S[facility] Surgical 72538



                        MD Jessica:                 [facility] 



                        [address] [place] [address],                                 



                        [address],                                 



                        [place], [place]                                   



                        [address],                                 



                        Ph.                                     



                        [phone]                                 



                        9                                       

 

        2019 Pete Banuelos [id number]





        00:00:00 00:00:00 Sathish                 [facility] 84497



                        MD Jessica:                 [facility] 



                        [address] [place] [address],                                 



                        [address],                                 



                        [place], [place]                                   



                        [address],                                 



                        Ph. [phone]                                 

 

        2019 Arcelia Banuelos [id number]





        00:00:00 00:00:00 Rich,                 Surgical Surgical 80122



                        DPT: [facility]                 [facility] 



                        [facility],                                 



                        [place], [place]                                   



                        [address],                                 



                        Ph. [phone]                                 

 

        2019 Angi Banuelos 01751

6_201



        00:00:00 00:00:00 Llyod, PTA:                 [facility] 60639



                        [address]                    [facility] 



                        [address],                                 



                        [place]                                 



                        [place], [place]                                   



                        [address],                                 



                        Ph. [phone]                                 

 

        2019 Angi Banuelos 28162

6_201



        00:00:00 00:00:00 Llyod, PTA:                 [facility] 42573



                        [address]                    [facility] 



                        [address]                                 



                        [address],                                 



                        [place]                                 



                        [place], [place]                                   



                        [address],                                 



                        Ph. [phone]                                 



                        [phone]                                 

 

        2019 Arcelia Andersont [id number]





        00:00:00 00:00:00 Rich,                 Surgical Surgical 28308



                        DPT: [address]                 [facility] 



                        [address]                                 



                        [address],                                 



                        [place]                                 



                        [place], [place]                                   



                        [address],                                 



                        Ph. [phone]                                 



                        [phone]                                 

 

        2019 Angi Banuelos 58745

6_201



        00:00:00 00:00:00 Llyod, PTA:                 Surgical Surgical 59614



                        [address]                    [facility] 



                        [address]                                 



                        [address],                                 



                        [place]                                 



                        [place], [place]                                   



                        [address],                                 



                        Ph. [phone]                                 



                        [phone]                                 

 

        2019 Pete Andersont [id number]





        00:00:00 00:00:00 Sathish                 Surgical Surgical 10493



                        MD Jessica:                 [facility] 



                        [facility]                                    



                        [facility],                                 



                        [address],                                 



                        [place]                                 



                        [place], [place]                                   



                        [address],                                 



                        Ph. [phone]                                 



                        [phone]                                 

 

        2019 Pete Garciaeret [id number]





        00:00:00 00:00:00 Sathish                 Surgical Surgical 40889



                        MD Jessica:                 [facility] 



                        [facility]                                    



                        [facility],                                 



                        [address],                                 



                        [place]                                 



                        [place], [place]                                   



                        [address],                                 



                        Ph. [phone]                                 



                        [phone]                                 

 

        2019 Arcelia Andersont [id number]





        00:00:00 00:00:00 Rich,                 Surgical Surgical 10606



                        DPT: [address]                 [facility] 



                        [address]                                 



                        [address],                                 



                        [place]                                 



                        [place], [place]                                   



                        [address],                                 



                        Ph. [phone]                                 

 

        2019 Arcelia Garciaeret [id number]





        00:00:00 00:00:00 Rich,                 Surgical Surgical 56879



                        DPT: [address] [facility] [address],                                 



                        [place]                                 



                        [place], [place]                                   



                        [address],                                 



                        Ph. [phone]                                 



                        [phone]                                 

 

        2019 Arcelia Garciaeret [id number]





        00:00:00 00:00:00 Rich,                 Surgical Surgical 38176



                        DPT: [address]                 [facility],                                 



                        [place]                                 



                        [place], [place]                                   



                        [address],                                 



                        Ph. [phone]                                 



                        [phone]                                 

 

        2019 Arcelia Garciaeret [id number]





        00:00:00 00:00:00 Rich,                 Surgical Surgical 87105



                        DPT: [address]                 [facility],                                 



                        [facility]                                 



                        [place], [place]                                   



                        [address],                                 



                        Ph. [phone]                                 



                        [phone]                                 

 

        2019 Angi Garciaeret 57867

6_201



        00:00:00 00:00:00 Llyod, PTA:                 Surgical Surgical 69937



                        [address]                    [address] [facility] [address],                                 



                        [facility]                                 



                        [place], [place]                                   



                        [address],                                 



                        Ph. [phone]                                 



                        [phone]                                 

 

        2019 Angi Garciaeret 23718

2_201



        00:00:00 00:00:00 Llyod, PTA:                 Surgical Surgical 86071



                        [address]                    [facility],                                 



                        [facility]                                 



                        [place], [place]                                   



                        [address],                                 



                        Ph. [phone]                                 



                        [phone]                                 

 

        2019 Pete Garciaeret [id number]





        00:00:00 00:00:00 Sathish                 Surgical Surgical 87511Otoniel Lopez MD:                 [facility] 



                        [address] [place] [address],                                 



                        [address],                                 



                        [place]                                 



                        [place], [place]                                   



                        [address],                                 



                        Ph. [phone]                                 

 

        2019 Pete Garciaeret [id number]





        00:00:00 00:00:00 Sathish                 Surgical Surgical 59827Otoniel Lopez MD:                 [facility] 



                        [address]                                    



                        [address],                                 



                        [address],                                 



                        [place]                                 



                        [place], [place]                                   



                        [address],                                 



                        Ph. [phone]                                 

 

        2019 Pam Banuelos [id number]





        00:00:00 00:00:00 Tremont,                 Surgical Surgical 35465



                        PTA: [address] [facility] [address],                                 



                        [place]                                 



                        [place], [place]                                   



                        [address],                                 



                        Ph. [phone]                                 



                        [phone]                                 

 

        2019 Pam Andersont [id number]





        00:00:00 00:00:00 Arin,                 Surgical Surgical 87426



                        PTA: [address] [facility] [address],                                 



                        [place]                                 



                        [place], [place]                                   



                        [address],                                 



                        Ph. [phone]                                 



                        [phone]                                 

 

        2019 Angi Banuelos 18729

6_201



        00:00:00 00:00:00 Llyod, PTA:                 Surgical Surgical 22323



                        [address]                    [facility] 



                        [address],                                 



                        [place]                                 



                        [place], [place]                                   



                        [address],                                 



                        Ph. [phone]                                 



                        [phone]                                 

 

        2019 Angi Andersont 35562

2_201



        00:00:00 00:00:00 Llyod, PTA:                 Surgical Surgical 08746



                        [address]                    [address] [facility] [address],                                 



                        [place]                                 



                        [place], [place]                                   



                        [address],                                 



                        Ph. [phone]                                 



                        [phone]                                 

 

        2019 Pam Andersont [id number]





        00:00:00 00:00:00 Arin,                 Surgical Surgical 04101



                        PTA: [address]                 [facility] 



                        [address],                                 



                        [place]                                 



                        [place], [place]                                   



                        [address],                                 



                        Ph. [phone]                                 



                        [phone]                                 

 

        2019 Pam Andersont [id number]





        00:00:00 00:00:00 Arin,                 Surgical Surgical 41361



                        PTA: [address] [facility] [address],                                 



                        [place]                                 



                        [place], [place]                                   



                        [address],                                 



                        Ph. [phone]                                 

 

        2019-01-10 2019-01-10 Arcelia Andersont [id number]





        00:00:00 00:00:00 Rich,                 Surgical Surgical 75603



                        DPT: [address]                 [facility] 



                        [address]                                 



                        [address],                                 



                        [place]                                 



                        [place], [place]                                   



                        [address],                                 



                        Ph. [phone]                                 



                        [phone]                                 

 

        2019-01-10 2019-01-10 Arcelia Andersont [id number]





        00:00:00 00:00:00 Rich,                 Surgical Surgical 09893



                        DPT: [address]                 [facility] 



                        [address],                                 



                        [place]                                 



                        [place], [place]                                   



                        [address],                                 



                        Ph. [phone]                                 



                        [phone]                                 

 

        2019 Angi Garciaeret 60148

6_201



        00:00:00 00:00:00 Llyod, PTA:                 Surgical Surgical 48147



                        [address]                    [facility] 



                        [address]                                 



                        [address],                                 



                        [place]                                 



                        [place], [place]                                   



                        [address],                                 



                        Ph. [phone]                                 



                        [phone]                                 

 

        2019 Angi Garciaeret 66148

2_201



        00:00:00 00:00:00 Llyod, PTA:                 Surgical Surgical 23841



                        [address]                    [facility] 



                        [address]                                 



                        [address],                                 



                        [place]                                 



                        [place], [place]                                   



                        [address],                                 



                        Ph. ([phone])                                 



                        [phone]                                 

 

        2019 Angi Garciaeret [id number]

6_201



        00:00:00 00:00:00 Llyod, PTA:                 Surgical Surgical 11796



                        [address]                    [facility] 



                        Country                                 



                        [address],                                 



                        [place]                                 



                        [place], [place]                                   



                        [address],                                 



                        Ph. ([phone])                                 



                        [phone]                                 

 

        2019 Angi Garciaeret 28725

2_201



        00:00:00 00:00:00 Llyod, PTA:                 Surgical Surgical 10809



                        [address]                    [facility] 



                        Country                                 



                        [address],                                 



                        [place]                                 



                        [place], [place]                                   



                        [address],                                 



                        Ph. ([phone])                                 



                        [phone]                                 

 

        2018 Arcelia Andersont [id number]





        00:00:00 00:00:00 Rich,                 Surgical Surgical 22475



                        DPT: [address]                 [facility] 



                        Country                                 



                        [address],                                 



                        [place]                                 



                        [place], [place]                                   



                        [address],                                 



                        Ph. ([phone])                                 



                        [phone]                                 

 

        2018 Arcelia Banuelos [id number]





        00:00:00 00:00:00 Rich,                 Surgical Surgical 09127



                        DPT: [address]                 [facility] 



                        [address],                                 



                        [place]                                 



                        [place], [place]                                   



                        [address],                                 



                        Ph. ([phone])                                 



                        [phone]                                 

 

        2018 Pete Andersont [id number]





        00:00:00 00:00:00 Sathish                 Surgical Surgical 62086



                        MD Jessica:                 [facility] 



                        [facility]                                    



                        Country                                 



                        [facility],                                 



                        [address],                                 



                        [place]                                 



                        [place], [place]                                   



                        [address],                                 



                        Ph. ([phone])                                 



                        [phone]                                 

 

        2018 Pete Andersont [id number]





        00:00:00 00:00:00 Sathish                 Surgical Surgical 21707



                        MD Jessica:                 [facility] 



                        [facility]                                    



                        [address] [facility] [place] [facility],                                 



                        [address],                                 



                        [place]                                 



                        [place], [place]                                   



                        [address],                                 



                        Ph. ([phone])                                 



                        [phone]                                 

 

        2018 Pete Andersont [id number]





        00:00:00 00:00:00 Sathish                 Surgical Surgical 04978



                        MD Jessica:                 [facility] 



                        [facility]                                    



                        [address],                                 



                        [address],                                 



                        [place]                                 



                        [place], [place]                                   



                        [address],                                 



                        Ph. ([phone])                                 



                        [phone]                                 

 

        2018 Pete                    Vin Andersont [id number]





        00:00:00 00:00:00 Sathish                 Surgical Surgical 29702



                        MD Jessica:                 [facility] 



                        [facility]                                    



                        [address] [facility] [place] [facility],                                 



                        [address],                                 



                        [place]                                 



                        [place], [place]                                   



                        [address],                                 



                        Ph. (350)                                 



                        [phone]                                 

 

        2018 Arcelia Andersont [id number]





        00:00:00 00:00:00 Rich,                 Surgical Surgical 52207



                        DPT: [address]                 [facility] 



                        [address],                                 



                        [place]                                 



                        [place], [place]                                   



                        [address],                                 



                        Ph. [phone]                                 

 

        2018 Arcelia Garciaeret [id number]





        00:00:00 00:00:00 Rich,                 Surgical Surgical 76390



                        DPT: [address]                 [facility] 



                        Country                                 



                        [address],                                 



                        [place]                                 



                        [place], [place]                                   



                        [address],                                 



                        Ph. ([phone])                                 



                        [phone]                                 

 

        2018 Arcelia Garciaeret [id number]_2





        00:00:00 00:00:00 Rich,                 Surgical Surgical 76387



                        DPT: [address]                 [facility] 



                        Country                                 



                        [address],                                 



                        [place]                                 



                        [place], [place]                                   



                        [address],                                 



                        Ph. ([phone])                                 



                        [phone]                                 

 

        2018 Arcelia Andersont [id number]





        00:00:00 00:00:00 Rich,                 Surgical Surgical 05355



                        DPT: [address]                 [facility] 



                        Country                                 



                        [address],                                 



                        [place]                                 



                        [place], [place]                                   



                        [address],                                 



                        Ph. (200)                                 



                        [phone]                                 

 

        2018 Arcelia Garciaeret [id number]_2





        00:00:00 00:00:00 Rich:                 Surgical Surgical 32063



                        [address]                    [facility] 



                        Country                                 



                        [address],                                 



                        [place]                                 



                        [place], [place]                                   



                        [address],                                 



                        Ph. ([phone])                                 



                        [phone]                                 

 

        2018 Arcelia Garciaeret [id number]





        00:00:00 00:00:00 Rich,                 Surgical Surgical 77228



                        DPT: [address]                 [facility] 



                        Country                                 



                        [address],                                 



                        [place]                                 



                        [place], [place]                                   



                        [address],                                 



                        Ph. (523)                                 



                        [phone]                                 

 

        2018 Arcelia Garciaeret 245296_2





        00:00:00 00:00:00 Rich:                 Surgical Surgical 02491



                        [address]                    [facility] 



                        Country                                 



                        [address],                                 



                        [place]                                 



                        [place], [place]                                   



                        [address],                                 



                        Ph. (910)                                 



                        [phone]                                 

 

        2018 Arcelia Banuelos Hensley [id number]





        00:00:00 00:00:00 Rich,                 Surgical Surgical 12637



                        DPT: [address]                 [facility] 



                        Country                                 



                        [address],                                 



                        [place]                                 



                        [place], [place]                                   



                        [address],                                 



                        Ph. [phone]                                 

 

        2018 Arcelia Garciaeret [id number]_2





        00:00:00 00:00:00 Rich:                 Surgical Surgical 77806



                        [address]                    [facility] 



                        Country                                 



                        [address],                                 



                        [place]                                 



                        [place], [place]                                   



                        [address],                                 



                        Ph. [phone]                                 

 

        2018 Arcelia aGrciaeret [id number]





        00:00:00 00:00:00 Rich:                 Surgical Surgical 13639



                        [address]                    [facility] 



                        Country                                 



                        [address],                                 



                        [place]                                 



                        [place], [place]                                   



                        [address],                                 



                        Ph. [phone]                                 

 

        2018 Pete Garciaeret [id number]_2





        00:00:00 00:00:00 Sathish                 Surgical Surgical 92944



                        MD Jessica:                 [facility] 



                        [address] [place] [address],                                 



                        [address],                                 



                        [facility]                                 



                        [place], [place]                                   



                        [address],                                 



                        Ph. [phone]                                 

 

        2018 Pete Garciaeret [id number]





        00:00:00 00:00:00 Sathish                 Surgical Surgical 30757



                        MD Jessica:                 [facility] 



                        [address] [place] [address],                                 



                        [address],                                 



                        [facility]                                 



                        [place], [place]                                   



                        [address],                                 



                        Ph. [phone]                                 

 

        2018 Arcelia Garciaeret 245296_2





        00:00:00 00:00:00 Rich:                 Surgical Surgical 84003



                        [address]                    [facility] 



                        Country                                 



                        [address],                                 



                        [place]                                 



                        [place], [place]                                   



                        [address],                                 



                        Ph. (452)                                 



                        [phone]                                 

 

        2018 Arcelia Garciaeret [id number]





        00:00:00 00:00:00 Rich:                 Surgical Surgical 08895



                        [address] [facility] [address],                                 



                        [place]                                 



                        [place], [place]                                   



                        [address],                                 



                        Ph. (094)                                 



                        [phone]                                 

 

        2018 Arcelia Garciaeret 245296_2





        00:00:00 00:00:00 Rich:                 Surgical Surgical 58503



                        [address]                    [facility] 



                        Country                                 



                        [address],                                 



                        [place]                                 



                        [place], [place]                                   



                        [address],                                 



                        Ph. [phone]                                 

 

        2018 Arcelia Garciaeret [id number]





        00:00:00 00:00:00 Rich:                 Surgical Surgical 98172



                        [address]                    [facility] 



                        Country                                 



                        [address],                                 



                        [place]                                 



                        [place], [place]                                   



                        [address],                                 



                        Ph. (870)                                 



                        [phone]                                 

 

        2018 Arcelia                 Vin Garciaeret [id number]_2





        00:00:00 00:00:00 Rich:                 Surgical Surgical 68247



                        [address]                    [facility] 



                        Country                                 



                        [address],                                 



                        [place]                                 



                        [place], [place]                                   



                        [address],                                 



                        Ph. ([phone])                                 



                        [phone]                                 

 

        2018 Arcelia                 Hensley Hensley [id number]





        00:00:00 00:00:00 Rich:                 Surgical Surgical 05846



                        [address]                    [facility] 



                        Country                                 



                        [address],                                 



                        [place]                                 



                        [place], [place]                                   



                        [address],                                 



                        Ph. (231)                                 



                        [phone]                                 

 

        2018 Arcelia                 Hensley Hensley 245296_2





        00:00:00 00:00:00 Rich:                 Surgical Surgical 37033



                        [address]                    [facility] 



                        Country                                 



                        [address],                                 



                        [place]                                 



                        [place], [place]                                   



                        [address],                                 



                        Ph. (910)                                 



                        [phone]                                 

 

        2018 Arcelia                 Hensley Hensley [id number]





        00:00:00 00:00:00 Rich:                 Surgical Surgical 60733



                        [address]                    [facility] 



                        Country                                 



                        [address],                                 



                        [place]                                 



                        [place], [place]                                   



                        [address],                                 



                        Ph. ([phone])                                 



                        [phone]                                 

 

        2018-10-30 2018-10-30 Arcelia                 Hensley Hensley [id number]





        00:00:00 00:00:00 Rich:                 Surgical Surgical 82078



                        [address]                    [facility] 



                        Country                                 



                        [address],                                 



                        [place]                                 



                        [place], [place]                                   



                        [address],                                 



                        Ph. (910)                                 



                        [phone]                                 

 

        2018-10-30 2018-10-30 Arcelia                 Hensley Hensley [id number]





        00:00:00 00:00:00 Rich:                 Surgical Surgical 35852



                        [address]                    [facility] 



                        Country                                 



                        [address],                                 



                        [place]                                 



                        [place], [place]                                   



                        [address],                                 



                        Ph. [phone]                                 

 

        2018-10-26 2018-10-26 Arcelia Garciaeret 245296_2





        00:00:00 00:00:00 Rich:                 Surgical Surgical 46228



                        [address]                    [facility] 



                        Country                                 



                        [address],                                 



                        [place]                                 



                        [place], [place]                                   



                        [address],                                 



                        Ph. [phone]                                 

 

        2018-10-26 2018-10-26 Arcelia Garciaeret [id number]





        00:00:00 00:00:00 Rich:                 Surgical Surgical 89036



                        [address]                    [facility] 



                        Country                                 



                        [address],                                 



                        [place]                                 



                        [place], [place]                                   



                        [address],                                 



                        Ph. [phone]                                 

 

        2018-10-24 2018-10-24 ePte Garciaeret 245296_2





        00:00:00 00:00:00 Sathish                 Surgical Surgical 43030



                        MD Jessica:                 [facility] 



                        [address] [place] [address],                                 



                        [address],                                 



                        [place]                                 



                        [place], [place]                                   



                        [address],                                 



                        Ph. ([phone])                                 



                        [phone]                                 

 

        2018-10-24 2018-10-24 Pete Garciaeret [id number]





        00:00:00 00:00:00 Sathish                 Surgical Surgical 67188



                        MD Jessica:                 [facility] 



                        [address] [place] [address],                                 



                        [address],                                 



                        [place]                                 



                        [place], [place]                                   



                        [address],                                 



                        Ph. (773)                                 



                        [phone]                                 

 

        2018-10-23 2018-10-23 Carlos Alberto Garciaeret [id number]

[id number]



        00:00:00 00:00:00 Barley,                 Surgical Surgical 44160



                        PTA: [address]                 [facility] 



                        Country                                 



                        [address],                                 



                        [place]                                 



                        [place], [place]                                   



                        [address],                                 



                        Ph. [phone]                                 

 

        2018-10-23 2018-10-23 Carlos Alberto Garciaeret [id number]

[id number]



        00:00:00 00:00:00 Barley,                 [facility] [id number]



                        PTA: [address]                 [facility] 



                        [address]                                 



                        [address]                                 



                        [place]                                 



                        [place], [place]                                   



                        [address],                                 



                        Ph. [phone]                                 

 

        2018-10-19 2018-10-19 Arcelia Garciaeret [id number]





        00:00:00 00:00:00 Rich:                 [facility] [address]



                        [address]                    [facility] 



                        Country                                 



                        [address],                                 



                        [place]                                 



                        [place], [place]                                   



                        [address],                                 



                        Ph. [phone]                                 

 

        2018-10-19 2018-10-19 Arcelia Garciaeret [id number]





        00:00:00 00:00:00 Rich:                 [facility] [address]



                        [address],                                 



                        [place]                                 



                        [place], [place]                                   



                        [address],                                 



                        Ph. [phone]                                 

 

        2018-10-16 2018-10-16 Arcelia Garciaeret [id number]





        00:00:00 00:00:00 Rich:                 [facility] [address]



                        [address],                                 



                        [place]                                 



                        [place], [place]                                   



                        [address],                                 



                        Ph. [phone]                                 

 

        2018-10-16 2018-10-16 Arcelia Garciaeret [id number]





        00:00:00 00:00:00 Rich:                 [facility] [address]



                        [address],                                 



                        [place]                                 



                        [place], [place]                                   



                        [address],                                 



                        Ph. [phone]                                 







Immunizations







           Ordered Immunization Filled Immunization Date       Status     Commen

ts   Refusal Reason



           Name       Name                                        

 

           Influenza, Southern            2019-10-01 Completed             



           Hemisp[place]            00:00:00                         

 

           influenza,            2018-10-01 Completed             



           injectable,            00:00:00                         



           quadrivalent                                             

 

           Pneumococcal            2017-10-01 Completed             



           Conjugate,            00:00:00                         



           unspecified                                             



           formulation                                             

 

           influenza,            2017-10-01 Completed             



           unspecified            00:00:00                         



           formulation                                             







Social History







                    Smoking Status      Start Date          Stop Date

 

                    Former Smoker                           

 

                    Never Smoker                            







Vital Signs







                Vital Name      Observation Time Observation Value Comments

 

                Height          2020 00:00:00 73 [in_i]       

 

                BMI (Body Mass Index) 2020 00:00:00 30.5 kg/m2      

 

                Body Weight     2020 00:00:00 231 [lb_av]     

 

                Height          2020 00:00:00 73 [in_i]       

 

                BMI (Body Mass Index) 2020 00:00:00 30.5 kg/m2      

 

                Body Weight     2020 00:00:00 231 [lb_av]     

 

                Height          2019 00:00:00 73 [in_i]       

 

                BMI (Body Mass Index) 2019 00:00:00 30.3 kg/m2      

 

                Body Weight     2019 00:00:00 230 [lb_av]     

 

                Height          2019-10-29 00:00:00 73 [in_i]       

 

                BMI (Body Mass Index) 2019-10-29 00:00:00 30.3 kg/m2      

 

                Body Weight     2019-10-29 00:00:00 230 [lb_av]     

 

                BP Diastolic    2019 00:00:00 70 mm[Hg]       

 

                Height          2019 00:00:00 73 [in_i]       

 

                BMI (Body Mass Index) 2019 00:00:00 30.5 kg/m2      

 

                BP Systolic     2019 00:00:00 140 mm[Hg]      

 

                Body Weight     2019 00:00:00 231 [lb_av]     

 

                Height          2019 00:00:00 73 [in_i]       

 

                BMI (Body Mass Index) 2019 00:00:00 30.5 kg/m2      

 

                Body Weight     2019 00:00:00 231 [lb_av]     

 

                Height          2019 00:00:00 73 [in_i]       

 

                BMI (Body Mass Index) 2019 00:00:00 30.5 kg/m2      

 

                Body Weight     2019 00:00:00 231 [lb_av]     

 

                Height          2019 00:00:00 73 [in_i]       

 

                BMI (Body Mass Index) 2019 00:00:00 30.5 kg/m2      

 

                Body Weight     2019 00:00:00 231 [lb_av]     

 

                BP Diastolic    2018 00:00:00 67 mm[Hg]       

 

                Height          2018 00:00:00 73 [in_i]       

 

                BMI (Body Mass Index) 2018 00:00:00 30.5 kg/m2      

 

                BP Systolic     2018 00:00:00 139 mm[Hg]      

 

                Body Weight     2018 00:00:00 231 [lb_av]     

 

                Height          2018 00:00:00 73 [in_i]       

 

                BMI (Body Mass Index) 2018 00:00:00 30.5 kg/m2      

 

                Body Weight     2018 00:00:00 231 [lb_av]     

 

                Height          2018 00:00:00 73 [in_i]       

 

                BMI (Body Mass Index) 2018 00:00:00 30.5 kg/m2      

 

                Body Weight     2018 00:00:00 231 [lb_av]     







Hospital Discharge Instructions

1. History of total knee arthroplasty 2. Pain in right knee 3. Pain in left knee
 Discussion Note: None recorded. Patient educational handouts: No information 
available.1. Pain in left knee 2. Pain in right knee 3. History of total knee 
arthroplasty 4. Knee pain  XR, knee, 3 view  physical therapy referral - 
PT Bilateral Knee Focusing on Balance, evaluate and treat Discussion Note: None 
recorded. Patient educational handouts: No information available.1. Shoulder 
pain  shoulder pain: care instructions  XR, shoulder 2. Bursitis of left 
shoulder 3. Localized, primary osteoarthritis of the shoulder region  
shoulder arthritis: exercises Discussion Note: None recorded.1. Pain in left 
knee 2. Pain in right knee 3. History of total knee arthroplasty Discussion 
Note: None recorded. Patient educational handouts: No information available.

## 2020-11-16 ENCOUNTER — HOSPITAL ENCOUNTER (OUTPATIENT)
Dept: HOSPITAL 62 - OD | Age: 70
End: 2020-11-16
Attending: INTERNAL MEDICINE
Payer: MEDICARE

## 2020-11-16 DIAGNOSIS — R05: ICD-10-CM

## 2020-11-16 DIAGNOSIS — R06.00: Primary | ICD-10-CM

## 2020-11-16 LAB
ADD MANUAL DIFF: NO
ANION GAP SERPL CALC-SCNC: 10 MMOL/L (ref 5–19)
BASOPHILS # BLD AUTO: 0.1 10^3/UL (ref 0–0.2)
BASOPHILS NFR BLD AUTO: 1 % (ref 0–2)
BUN SERPL-MCNC: 25 MG/DL (ref 7–20)
CALCIUM: 10.1 MG/DL (ref 8.4–10.2)
CHLORIDE SERPL-SCNC: 106 MMOL/L (ref 98–107)
CO2 SERPL-SCNC: 25 MMOL/L (ref 22–30)
EOSINOPHIL # BLD AUTO: 0.6 10^3/UL (ref 0–0.6)
EOSINOPHIL NFR BLD AUTO: 6 % (ref 0–6)
ERYTHROCYTE [DISTWIDTH] IN BLOOD BY AUTOMATED COUNT: 14.1 % (ref 11.5–14)
GLUCOSE SERPL-MCNC: 135 MG/DL (ref 75–110)
HCT VFR BLD CALC: 39.4 % (ref 37.9–51)
HGB BLD-MCNC: 13.5 G/DL (ref 13.5–17)
LYMPHOCYTES # BLD AUTO: 1.3 10^3/UL (ref 0.5–4.7)
LYMPHOCYTES NFR BLD AUTO: 13.4 % (ref 13–45)
MCH RBC QN AUTO: 31.5 PG (ref 27–33.4)
MCHC RBC AUTO-ENTMCNC: 34.3 G/DL (ref 32–36)
MCV RBC AUTO: 92 FL (ref 80–97)
MONOCYTES # BLD AUTO: 1.1 10^3/UL (ref 0.1–1.4)
MONOCYTES NFR BLD AUTO: 11.6 % (ref 3–13)
NEUTROPHILS # BLD AUTO: 6.3 10^3/UL (ref 1.7–8.2)
NEUTS SEG NFR BLD AUTO: 68 % (ref 42–78)
PLATELET # BLD: 233 10^3/UL (ref 150–450)
POTASSIUM SERPL-SCNC: 4.1 MMOL/L (ref 3.6–5)
RBC # BLD AUTO: 4.3 10^6/UL (ref 4.35–5.55)
TOTAL CELLS COUNTED % (AUTO): 100 %
WBC # BLD AUTO: 9.3 10^3/UL (ref 4–10.5)

## 2020-11-16 PROCEDURE — 86225 DNA ANTIBODY NATIVE: CPT

## 2020-11-16 PROCEDURE — 83880 ASSAY OF NATRIURETIC PEPTIDE: CPT

## 2020-11-16 PROCEDURE — 71046 X-RAY EXAM CHEST 2 VIEWS: CPT

## 2020-11-16 PROCEDURE — 36415 COLL VENOUS BLD VENIPUNCTURE: CPT

## 2020-11-16 PROCEDURE — 80048 BASIC METABOLIC PNL TOTAL CA: CPT

## 2020-11-16 PROCEDURE — 85025 COMPLETE CBC W/AUTO DIFF WBC: CPT

## 2020-11-16 PROCEDURE — 86235 NUCLEAR ANTIGEN ANTIBODY: CPT

## 2020-11-16 NOTE — RADIOLOGY REPORT (SQ)
EXAM DESCRIPTION:  CHEST PA/LATERAL



IMAGES COMPLETED DATE/TIME:  11/16/2020 12:05 pm



REASON FOR STUDY:  DYSPNEA, UNSPECIFIED



COMPARISON:  8/4/2019



EXAM PARAMETERS:  NUMBER OF VIEWS: two views

TECHNIQUE: Digital Frontal and Lateral radiographic views of the chest acquired.

RADIATION DOSE: NA

LIMITATIONS: none



FINDINGS:  LUNGS AND PLEURA: There are chronic interstitial changes.  The right hemidiaphragm is elev
ated.  No acute infiltrate or effusion.

MEDIASTINUM AND HILAR STRUCTURES: No masses or contour abnormalities.

HEART AND VASCULAR STRUCTURES: Heart normal size.  No evidence for failure.

BONES: No acute findings.

HARDWARE: None in the chest.

OTHER: No other significant finding.



IMPRESSION:  Chronic lung changes with no acute cardiopulmonary findings.



TECHNICAL DOCUMENTATION:  JOB ID:  [id number]

 2011 [employer]- All Rights Reserved



Reading location - IP/workstation name: [email or web address]

## 2020-11-17 LAB
ANTICHROMATIN AB: <0.2 AI (ref 0–0.9)
CENTROMERE B AB: <0.2 AI (ref 0–0.9)
DNA DOUBLE STRAND ANTIBODY ANA: <1 IU/ML (ref 0–9)
ENA JO1 AB SER-ACNC: <0.2 AI (ref 0–0.9)
SJOGREN'S ANTI-SS-B AB: <0.2 AI (ref 0–0.9)
SJOGREN'S SS-A ANTIBODY: <0.2 AI (ref 0–0.9)

## 2020-12-08 ENCOUNTER — HOSPITAL ENCOUNTER (EMERGENCY)
Dept: HOSPITAL 62 - ER | Age: 70
Discharge: HOME | End: 2020-12-08
Payer: MEDICARE

## 2020-12-08 VITALS — DIASTOLIC BLOOD PRESSURE: 74 MMHG | SYSTOLIC BLOOD PRESSURE: 121 MMHG

## 2020-12-08 DIAGNOSIS — J12.89: ICD-10-CM

## 2020-12-08 DIAGNOSIS — E11.9: ICD-10-CM

## 2020-12-08 DIAGNOSIS — Z87.891: ICD-10-CM

## 2020-12-08 DIAGNOSIS — R06.02: ICD-10-CM

## 2020-12-08 DIAGNOSIS — R05: ICD-10-CM

## 2020-12-08 DIAGNOSIS — I10: ICD-10-CM

## 2020-12-08 DIAGNOSIS — U07.1: Primary | ICD-10-CM

## 2020-12-08 DIAGNOSIS — Z79.899: ICD-10-CM

## 2020-12-08 DIAGNOSIS — Z20.828: ICD-10-CM

## 2020-12-08 DIAGNOSIS — M79.10: ICD-10-CM

## 2020-12-08 PROCEDURE — 93010 ELECTROCARDIOGRAM REPORT: CPT

## 2020-12-08 PROCEDURE — 99285 EMERGENCY DEPT VISIT HI MDM: CPT

## 2020-12-08 PROCEDURE — 87635 SARS-COV-2 COVID-19 AMP PRB: CPT

## 2020-12-08 PROCEDURE — C9803 HOPD COVID-19 SPEC COLLECT: HCPCS

## 2020-12-08 PROCEDURE — 71045 X-RAY EXAM CHEST 1 VIEW: CPT

## 2020-12-08 PROCEDURE — 93005 ELECTROCARDIOGRAM TRACING: CPT

## 2020-12-08 NOTE — EKG REPORT
SEVERITY:- ABNORMAL ECG -

SINUS RHYTHM

RIGHT BUNDLE BRANCH BLOCK

:

Confirmed by: Keiry Blake 08-Dec-2020 17:55:48

## 2020-12-08 NOTE — RADIOLOGY REPORT (SQ)
EXAM DESCRIPTION:  CHEST SINGLE VIEW



IMAGES COMPLETED DATE/TIME:  12/8/2020 3:14 pm



REASON FOR STUDY:  SOB



COMPARISON:  11/16/2020.



EXAM PARAMETERS:  NUMBER OF VIEWS: One view.

TECHNIQUE: Single frontal radiographic view of the chest acquired.

RADIATION DOSE: NA

LIMITATIONS: None.



FINDINGS:  LUNGS AND PLEURA: Chronic interstitial changes.  Slightly hazy appearance of the right upp
er lobe.  No large pleural effusion.  No pneumothorax.

MEDIASTINUM AND HILAR STRUCTURES: No masses.  Contour normal.

HEART AND VASCULAR STRUCTURES: Heart normal in size.  Normal vasculature.

BONES: No acute findings.

HARDWARE: None in the chest.

OTHER: No other significant finding.



IMPRESSION:  CHRONIC INTERSTITIAL CHANGES.  POSSIBLE FAINT AIRSPACE DISEASE IN THE RIGHT UPPER LOBE.



TECHNICAL DOCUMENTATION:  JOB ID:  [id number]

 2011 [employer]- All Rights Reserved



Reading location - IP/workstation name: [email or web address]

## 2020-12-08 NOTE — ER DOCUMENT REPORT
ED Respiratory Problem





- General


Chief Complaint: Cold Symptoms


Stated Complaint: COUGH,SHORT OF BREATH,BODY ACHES


Time Seen by Provider: 12/08/20 13:27


Primary Care Provider: 


REBECCA CARCAMO MD [Primary Care Provider] - Follow up as needed


TRAVEL OUTSIDE OF THE [place] IN LAST 30 DAYS: No





- HPI


Notes: 





70-year-old male presents to ED for evaluation of cold and cough symptoms.  Re

ports he has had symptoms for roughly 1 week.  Patient lives with his son-in-law

who is tested positive for Covid.  Patient's wife is also symptomatic.  Patient 

had seen his primary care physician within the last week and was started on a 

course of prednisone with some improvement in his symptoms.  Patient was also 

switched off his lisinopril which they believe is contributing to his cough and 

started on amlodipine with improvement.  He denies any chest discomfort.  Denies

fever or chills.  Denies abdominal pain or changes in bowel or bladder habit.  

They have not done any recent traveling.  Patient has not taken any 

over-the-counter medicines for improvement in his symptoms.





- Related Data


Allergies/Adverse Reactions: 


                                        





No Known Allergies Allergy (Verified 12/08/20 13:01)


   








Home Medications: prednisone, amlodipine





Past Medical History





- General


Information source: Patient, Relative





- Social History


Smoking Status: Former Smoker


Family History: None


Patient has homicidal ideation: No





- Past Medical History


Cardiac Medical History: Reports: Hx Hypercholesterolemia, Hx Hypertension


   Denies: Hx Atrial Fibrillation, Hx Congestive Heart Failure, Hx Coronary 

Artery Disease, Hx Heart Attack, Hx Peripheral Vascular Disease, Hx Pulmonary 

Embolism, Hx Heart Murmur


Pulmonary Medical History: Reports: Hx Bronchitis - FEW YEARS AGO 2013


   Denies: Hx Asthma, Hx COPD, Hx Pneumonia, Hx Respiratory Failure, Hx Sleep 

Apnea, Hx Tuberculosis


Neurological Medical History: Denies: Hx Cerebrovascular Accident, Hx Seizures


Endocrine Medical History: Reports: Hx Diabetes Mellitus Type 2 - edge of being 

diabetic.  Denies: Hx Hyperthyroidism, Hx Hypothyroidism


Renal/ Medical History: Reports: Hx Benign Prostatic Hyperplasia, Hx Kidney 

Stones.  Denies: Hx End Stage Renal Disease, Hx Peritoneal Dialysis


Malignancy Medical History: Denies Hx Lung Cancer


GI Medical History: Reports: Hx Gastroesophageal Reflux Disease.  Denies: Hx 

Crohn's Disease, Hx Hiatal Hernia, Hx Irritable Bowel, Hx Liver Failure, Hx 

Pancreatitis, Hx Ulcer


Musculoskeletal Medical History: Reports Hx Arthritis - OA, Denies Hx 

Fibromyalgia, Denies Hx Muscular Dystrophy


Psychiatric Medical History: 


   Denies: Hx Bipolar Disorder, Hx Depression, Hx Post Traumatic Stress Disorder


Traumatic Medical History: Reports: Hx Fractures - right hand


Past Surgical History: Reports: Hx Orthopedic Surgery - bilateral knee 

replacement.  Denies: Hx Appendectomy, Hx Bowel Surgery, Hx Cholecystectomy, Hx 

Colostomy, Hx Coronary Artery Bypass Graft, Hx Gastric Bypass Surgery, Hx 

Herniorrhaphy, Hx Pacemaker, Hx Tonsillectomy





- Immunizations


Immunizations up to date: Yes


Hx Diphtheria, Pertussis, Tetanus Vaccination: Yes


Hx Pneumococcal Vaccination: 10/01/17





Review of Systems





- Review of Systems


Notes: 





Constitutional: Negative for fever.


HENT: Negative for sore throat.


Eyes: Negative for visual changes.


Cardiovascular: Negative for chest pain.


Respiratory: + for shortness of breath.


Gastrointestinal: Negative for abdominal pain, vomiting or diarrhea.


Genitourinary: Negative for dysuria.


Musculoskeletal: Negative for back pain.


Skin: Negative for rash.


Neurological: Negative for headaches, weakness or numbness.





10 point ROS negative except as marked above and in HPI.





Physical Exam





- Vital signs


Vitals: 


                                        











Temp Pulse Resp BP Pulse Ox


 


 97.7 F   90   20   119/76   95 


 


 12/08/20 12:39  12/08/20 12:39  12/08/20 12:39  12/08/20 12:39  12/08/20 12:39








General: No acute distress. Alert and oriented x3. Sitting comfortably in a 

stretcher.


Skin: Intact without any jaundice, pallor, or erythema. Warm and dry.


HEENT: Normocephalic, atraumatic. Pupils are equal round reactive to light and 

accommodation.


Extraocular movements are intact. TMs without erythema or bulging. Canals are 

clear. Nares patent


without any discharge. Teeth in good condition. Pharynx without erythema, edema,

or exudates. No


tonsillar enlargement. Uvula is midline. Airway is patent.


Neck: Supple with no lymphadenopathy. Full range of motion.


Heart: Regular rate and rhythm. S1,S2. No murmurs, rubs, or gallops.


Lungs: Crackles to the right upper lobe. No wheezing or rhonchi. Equal chest 

expansion. No


retractions.


Abdomen: Soft, nontender to palpation, nondistended. Positive bowel sounds in 

all 4 quadrants. No


hepatosplenomegaly. No masses. No CVA tenderness bilaterally.


Neuro: GCS 15. Moving all extremities without discomfort.


Extremities: No calf tenderness or edema. No cyanosis or clubbing. Radial and 

pedal pulses 2+


bilaterally. Brisk capillary refill.


Psych: Mood and affect appropriate.





Course





- Re-evaluation


Re-evalutation: 





12/08/20 20:56


70-year-old male presents to ED for evaluation of possible Covid exposure.  His 

son-in-law tested positive for Covid today.  He has had some increased shortness

of breath.  He was started on steroids last week with similar complaints by his 

primary care physician however not tested at that time.  Patient was also 

switched to a new blood pressure medication as he has had a cough and they were 

concerned that his lisinopril medication may have been the cause.  Does have 

some improvement.  Patient was evaluated with a chest x-ray which does show a 

right upper lobe infiltrate.  Patient will be placed on doxycycline.  Patient 

was also given a new inhaler.  Advised to continue his prednisone.  Advised that

he will be tested for Covid and placed on home precautions for isolation until t

he results are returned.  He is advised to have close follow-up with his primary

care physician return for any new or worsening symptoms.  He understands the 

indications to return to the ED and is in agreement with this plan of 

management.





- Vital Signs


Vital signs: 


                                        











Temp Pulse Resp BP Pulse Ox


 


 98.0 F   87   18   121/74   99 


 


 12/08/20 16:56  12/08/20 16:56  12/08/20 16:56  12/08/20 16:56  12/08/20 16:56














- Laboratory Results


Critical Laboratory Results Reviewed: No Critical Results





- Radiology Results


Critical Radiology Results Reviewed: No Critical Results - Pneumonia noted and 

antibiotics started





Discharge





- Discharge


Clinical Impression: 


 Person under investigation for COVID-19





Right upper lobe pneumonia


Qualifiers:


 Pneumonia type: due to unspecified organism Qualified Code(s): J18.9 - 

Pneumonia, unspecified organism





Condition: Stable


Disposition: HOME, SELF-CARE


Instructions:  COVID-19 Guidance for Persons Under Investigation


Prescriptions: 


Albuterol Sulfate [Albuterol Sulfate Hfa] 18 gm IH Q4H PRN #1 [id number]


 PRN Reason: 


Doxycycline Hyclate [Vibramycin 100 mg Tablet] 100 mg PO BID #28 tablet


Referrals: 


REBECCA CARCAMO MD [Primary Care Provider] - Follow up as needed

## 2020-12-31 ENCOUNTER — HOSPITAL ENCOUNTER (OUTPATIENT)
Dept: HOSPITAL 62 - RAD | Age: 70
End: 2020-12-31
Attending: INTERNAL MEDICINE
Payer: MEDICARE

## 2020-12-31 DIAGNOSIS — J84.10: Primary | ICD-10-CM

## 2020-12-31 PROCEDURE — 71250 CT THORAX DX C-: CPT

## 2020-12-31 NOTE — RADIOLOGY REPORT (SQ)
EXAM DESCRIPTION:  CT CHEST WITHOUT



IMAGES COMPLETED DATE/TIME:  12/31/2020 9:05 am



REASON FOR STUDY:  PULMONARY FIBROSIS, UNSPECIFIED J84.10  PULMONARY FIBROSIS, UNSPECIFIED



COMPARISON:  None.



TECHNIQUE:  CT scan performed of the chest without intravenous contrast.  Images reviewed with lung, 
soft tissue and bone windows.  Reconstructed coronal and sagittal MPR images reviewed.  All images st
ored on PACS.

All CT scanners at this facility use dose modulation, iterative reconstruction, and/or weight based d
osing when appropriate to reduce radiation dose to as low as reasonably achievable (ALARA).

[facility]: Dose Right  [facility]: CareDose    [facility]: Dose Right    [facility]: Teradose 4D    [facility]: Smart [employer]



RADIATION DOSE:  CT Rad equipment meets quality standard of care and radiation dose reduction techniq
ues were employed. CTDIvol: 14.2 mGy. DLP: 602 mGy-cm. mGy.



LIMITATIONS:  No technical limitations.



FINDINGS:  LUNGS AND PLEURA: There are small patchy peripheral areas of ground-glass opacification in
 the lungs.  Mild peripheral pulmonary fibrosis.  Mild lower lobe bronchiectasis.

HILAR AND MEDIASTINAL STRUCTURES: No identified masses or abnormal nodes.  No obvious aneurysm.

HEART AND VASCULAR STRUCTURES: No aneurysm.  No pericardial effusion.

UPPER ABDOMEN: No significant findings.  Limited exam.

THYROID AND OTHER SOFT TISSUES: No masses.  No adenopathy.

BONES: No significant finding.

HARDWARE: None in the chest.

OTHER: No other significant findings.



IMPRESSION:  1.  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  Lower lobe bronchiectasis.

2.  There are small patchy peripheral areas of ground-glass opacification that may suggest superimpos
ed COVID-19 pneumonia.  Correlate clinically.



TECHNICAL DOCUMENTATION:  JOB ID:  [id number]

Quality ID # [id number]: Final reports with documentation of one or more dose reduction techniques (e.g., Au
tomated exposure control, adjustment of the mA and/or kV according to patient size, use of iterative 
reconstruction technique)

 2011 [employer]- All Rights Reserved



Reading location - IP/workstation name: [email or web address]